# Patient Record
Sex: MALE | Race: BLACK OR AFRICAN AMERICAN | Employment: OTHER | URBAN - METROPOLITAN AREA
[De-identification: names, ages, dates, MRNs, and addresses within clinical notes are randomized per-mention and may not be internally consistent; named-entity substitution may affect disease eponyms.]

---

## 2020-07-31 ENCOUNTER — APPOINTMENT (OUTPATIENT)
Dept: GENERAL RADIOLOGY | Age: 65
End: 2020-07-31

## 2020-07-31 ENCOUNTER — HOSPITAL ENCOUNTER (EMERGENCY)
Age: 65
Discharge: HOME | End: 2020-07-31
Attending: EMERGENCY MEDICINE

## 2020-07-31 VITALS
TEMPERATURE: 99.1 F | DIASTOLIC BLOOD PRESSURE: 90 MMHG | HEART RATE: 85 BPM | SYSTOLIC BLOOD PRESSURE: 134 MMHG | RESPIRATION RATE: 20 BRPM | OXYGEN SATURATION: 98 %

## 2020-07-31 LAB
ANION GAP SERPL CALCULATED.3IONS-SCNC: 10 MMOL/L (ref 3–16)
BASE EXCESS VENOUS: 2.6 MMOL/L (ref -2–3)
BASOPHILS ABSOLUTE: 0 K/UL (ref 0–0.2)
BASOPHILS RELATIVE PERCENT: 0.5 %
BUN BLDV-MCNC: 9 MG/DL (ref 7–20)
C-REACTIVE PROTEIN: 11 MG/L (ref 0–5.1)
CALCIUM SERPL-MCNC: 9.1 MG/DL (ref 8.3–10.6)
CARBOXYHEMOGLOBIN: 3.1 % (ref 0–1.5)
CHLORIDE BLD-SCNC: 100 MMOL/L (ref 99–110)
CO2: 26 MMOL/L (ref 21–32)
CREAT SERPL-MCNC: 0.9 MG/DL (ref 0.8–1.3)
EKG ATRIAL RATE: 83 BPM
EKG DIAGNOSIS: NORMAL
EKG P AXIS: 26 DEGREES
EKG P-R INTERVAL: 192 MS
EKG Q-T INTERVAL: 380 MS
EKG QRS DURATION: 88 MS
EKG QTC CALCULATION (BAZETT): 446 MS
EKG R AXIS: 23 DEGREES
EKG T AXIS: 101 DEGREES
EKG VENTRICULAR RATE: 83 BPM
EOSINOPHILS ABSOLUTE: 0.1 K/UL (ref 0–0.6)
EOSINOPHILS RELATIVE PERCENT: 1.7 %
GFR AFRICAN AMERICAN: >60
GFR NON-AFRICAN AMERICAN: >60
GLUCOSE BLD-MCNC: 240 MG/DL (ref 70–99)
HCO3 VENOUS: 26.7 MMOL/L (ref 24–28)
HCT VFR BLD CALC: 43.9 % (ref 40.5–52.5)
HEMOGLOBIN, VEN, REDUCED: 5.2 %
HEMOGLOBIN: 14.5 G/DL (ref 13.5–17.5)
LACTIC ACID: 2 MMOL/L (ref 0.4–2)
LYMPHOCYTES ABSOLUTE: 1.5 K/UL (ref 1–5.1)
LYMPHOCYTES RELATIVE PERCENT: 19.2 %
MCH RBC QN AUTO: 25.8 PG (ref 26–34)
MCHC RBC AUTO-ENTMCNC: 33.1 G/DL (ref 31–36)
MCV RBC AUTO: 77.9 FL (ref 80–100)
METHEMOGLOBIN VENOUS: 0.4 % (ref 0–1.5)
MONOCYTES ABSOLUTE: 0.6 K/UL (ref 0–1.3)
MONOCYTES RELATIVE PERCENT: 8.1 %
NEUTROPHILS ABSOLUTE: 5.5 K/UL (ref 1.7–7.7)
NEUTROPHILS RELATIVE PERCENT: 70.5 %
O2 SAT, VEN: 95 %
PCO2, VEN: 41.2 MMHG (ref 41–51)
PDW BLD-RTO: 15.1 % (ref 12.4–15.4)
PH VENOUS: 7.43 (ref 7.35–7.45)
PLATELET # BLD: 184 K/UL (ref 135–450)
PMV BLD AUTO: 8.4 FL (ref 5–10.5)
PO2, VEN: 69.7 MMHG (ref 25–40)
POTASSIUM REFLEX MAGNESIUM: 4.1 MMOL/L (ref 3.5–5.1)
RBC # BLD: 5.64 M/UL (ref 4.2–5.9)
SEDIMENTATION RATE, ERYTHROCYTE: 24 MM/HR (ref 0–20)
SODIUM BLD-SCNC: 136 MMOL/L (ref 136–145)
TCO2 CALC VENOUS: 28 MMOL/L
WBC # BLD: 7.8 K/UL (ref 4–11)

## 2020-07-31 PROCEDURE — 2580000003 HC RX 258: Performed by: PHYSICIAN ASSISTANT

## 2020-07-31 PROCEDURE — 73630 X-RAY EXAM OF FOOT: CPT

## 2020-07-31 PROCEDURE — 99284 EMERGENCY DEPT VISIT MOD MDM: CPT

## 2020-07-31 PROCEDURE — 93005 ELECTROCARDIOGRAM TRACING: CPT | Performed by: PHYSICIAN ASSISTANT

## 2020-07-31 PROCEDURE — 85025 COMPLETE CBC W/AUTO DIFF WBC: CPT

## 2020-07-31 PROCEDURE — 85652 RBC SED RATE AUTOMATED: CPT

## 2020-07-31 PROCEDURE — 86140 C-REACTIVE PROTEIN: CPT

## 2020-07-31 PROCEDURE — 80048 BASIC METABOLIC PNL TOTAL CA: CPT

## 2020-07-31 PROCEDURE — 82803 BLOOD GASES ANY COMBINATION: CPT

## 2020-07-31 PROCEDURE — 83605 ASSAY OF LACTIC ACID: CPT

## 2020-07-31 RX ORDER — 0.9 % SODIUM CHLORIDE 0.9 %
1000 INTRAVENOUS SOLUTION INTRAVENOUS ONCE
Status: COMPLETED | OUTPATIENT
Start: 2020-07-31 | End: 2020-07-31

## 2020-07-31 RX ADMIN — SODIUM CHLORIDE 1000 ML: 0.9 INJECTION, SOLUTION INTRAVENOUS at 10:58

## 2020-07-31 ASSESSMENT — ENCOUNTER SYMPTOMS
SHORTNESS OF BREATH: 0
VOMITING: 0
NAUSEA: 0
COUGH: 0
ABDOMINAL PAIN: 0

## 2020-07-31 ASSESSMENT — PAIN DESCRIPTION - LOCATION: LOCATION: FOOT

## 2020-07-31 ASSESSMENT — PAIN DESCRIPTION - FREQUENCY: FREQUENCY: CONTINUOUS

## 2020-07-31 ASSESSMENT — PAIN DESCRIPTION - PROGRESSION: CLINICAL_PROGRESSION: GRADUALLY WORSENING

## 2020-07-31 ASSESSMENT — PAIN DESCRIPTION - ORIENTATION: ORIENTATION: LEFT

## 2020-07-31 ASSESSMENT — PAIN DESCRIPTION - ONSET: ONSET: GRADUAL

## 2020-07-31 ASSESSMENT — PAIN DESCRIPTION - PAIN TYPE: TYPE: ACUTE PAIN

## 2020-07-31 ASSESSMENT — PAIN - FUNCTIONAL ASSESSMENT: PAIN_FUNCTIONAL_ASSESSMENT: ACTIVITIES ARE NOT PREVENTED

## 2020-07-31 ASSESSMENT — PAIN SCALES - GENERAL: PAINLEVEL_OUTOF10: 10

## 2020-07-31 ASSESSMENT — PAIN DESCRIPTION - DESCRIPTORS: DESCRIPTORS: ACHING

## 2020-07-31 NOTE — ED PROVIDER NOTES
[facility]          PHYSICIAN ASSISTANT NOTE       Date of evaluation: 7/31/2020    Chief Complaint     Foot Pain (diabetic)      History of Present Illness     Ezequiel Arguello is a 75 y.o. male with PMH of Diabetes, HTN, CVA who presents with Foot pain. Patient reports pain in the lateral aspect of his left foot for the past several days. He has also noticed associated swelling. He denies any injury or trauma but notes that he walks several miles daily in boots that are poor support. He states that he has been soaking his feet in Episom salt. He denies taking anything for pain. Denies any erythema, wounds, or drainage. No n/t. No fevers, chills. He reports that he generally feels well. Review of Systems     Review of Systems   Constitutional: Negative for chills and fever. Respiratory: Negative for cough and shortness of breath. Cardiovascular: Negative for chest pain. Gastrointestinal: Negative for abdominal pain, nausea and vomiting. Musculoskeletal: Negative for gait problem. Skin: Positive for wound. Neurological: Negative for weakness and numbness. Psychiatric/Behavioral: The patient is not nervous/anxious. Past Medical, Surgical, Family, and Social History     He has a past medical history of Diabetes mellitus ([facility] [address] He has no past surgical history on file. His family history is not on file. He reports that he has quit smoking. He does not have any smokeless tobacco history on file. He reports previous alcohol use. He reports previous drug use. Medications     Previous Medications    No medications on file       Allergies     He has No Known Allergies. Physical Exam     INITIAL VITALS: BP: (!) 141/92,Temp: 99.1 °F (37.3 °C), Pulse: 99, Resp: 18, SpO2: 97 %   Physical Exam  Vitals signs and nursing note reviewed. Constitutional:       General: He is not in acute distress. HENT:      Head: Normocephalic and atraumatic. acute osseous abnormality.                 LABS:   Results for orders placed or performed during the hospital encounter of 07/31/20   CBC Auto Differential   Result Value Ref Range    WBC 7.8 4.0 - 11.0 K/uL    RBC 5.64 4.20 - 5.90 M/uL    Hemoglobin 14.5 13.5 - 17.5 g/dL    Hematocrit 43.9 40.5 - 52.5 %    MCV 77.9 (L) 80.0 - 100.0 fL    MCH 25.8 (L) 26.0 - 34.0 pg    MCHC 33.1 31.0 - 36.0 g/dL    RDW 15.1 12.4 - 15.4 %    Platelets 010 474 - 191 K/uL    MPV 8.4 5.0 - 10.5 fL    Neutrophils % 70.5 %    Lymphocytes % 19.2 %    Monocytes % 8.1 %    Eosinophils % 1.7 %    Basophils % 0.5 %    Neutrophils Absolute 5.5 1.7 - 7.7 K/uL    Lymphocytes Absolute 1.5 1.0 - 5.1 K/uL    Monocytes Absolute 0.6 0.0 - 1.3 K/uL    Eosinophils Absolute 0.1 0.0 - 0.6 K/uL    Basophils Absolute 0.0 0.0 - 0.2 K/uL   Basic Metabolic Panel w/ Reflex to MG   Result Value Ref Range    Sodium 136 136 - 145 mmol/L    Potassium reflex Magnesium 4.1 3.5 - 5.1 mmol/L    Chloride 100 99 - 110 mmol/L    CO2 26 21 - 32 mmol/L    Anion Gap 10 3 - 16    Glucose 240 (H) 70 - 99 mg/dL    BUN 9 7 - 20 mg/dL    CREATININE 0.9 0.8 - 1.3 mg/dL    GFR Non-African American >60 >60    GFR African American >60 >60    Calcium 9.1 8.3 - 10.6 mg/dL   Sedimentation Rate   Result Value Ref Range    Sed Rate 24 (H) 0 - 20 mm/Hr   CRP   Result Value Ref Range    CRP 11.0 (H) 0.0 - 5.1 mg/L   Blood gas, venous (Lab)   Result Value Ref Range    pH, El 7.427 7.350 - 7.450    pCO2, El 41.2 41.0 - 51.0 mmHg    pO2, El 69.7 (H) 25.0 - 40.0 mmHg    HCO3, Venous 26.7 24.0 - 28.0 mmol/L    Base Excess, El 2.6 -2.0 - 3.0 mmol/L    O2 Sat, El 95 Not established %    Carboxyhemoglobin 3.1 (H) 0.0 - 1.5 %    MetHgb, El 0.4 0.0 - 1.5 %    TC02 (Calc), El 28 mmol/L    Hemoglobin, El, Reduced 5.20 %   Lactate, plasma   Result Value Ref Range    Lactic Acid 2.0 0.4 - 2.0 mmol/L   EKG 12 Lead   Result Value Ref Range    Ventricular Rate 83 BPM    Atrial Rate 83 BPM    P-R Interval 192 ms    QRS Duration 88 ms    Q-T Interval 380 ms    QTc Calculation (Bazett) 446 ms    P Axis 26 degrees    R Axis 23 degrees    T Axis 101 degrees    Diagnosis       EKG performed in ER and to be interpreted by ER physician. Confirmed by MD, ER ([id number]),  Sol Coyne ([phone]) on 7/31/2020 11:41:41 AM       RECENT VITALS:  BP: (!) 134/90, Temp: 99.1 °F (37.3 °C), Pulse: 85,Resp: 20, SpO2: 98 %     Procedures         ED Course     Nursing Notes, Past Medical Hx, Past Surgical Hx, Social Hx, Allergies, and Family Hx were reviewed. The patient was given the followingmedications:  Orders Placed This Encounter   Medications    0.9 % sodium chloride bolus       CONSULTS:  [id number] Rolanda Jamil / ROMELIA / Sandra Lindsey is a 75 y.o. male with PMH of Diabetes, HTN, CVA who presents with left foot pain. Patient denies any injury or trauma. He states he has been walking several miles daily. He complains of pain to the lateral plantar aspect of the left foot with associated swelling. No fevers or other systemic symptoms. Physical exam reveals an obese 49-year-old male in no acute distress. He is afebrile. He is tachycardic to 130's with regular rhythm. Lungs clear. Abdomen soft and non-tender. Left foot with callus on the plantar surface of the foot and great toe. There is tenderness and mild swelling noted to the lateral plantar surface of the left foot. No overlying erythema or warmth. No active drainage or fluctuance. Onychomycotic toenails. There is a superficial ulceration to the base of the 1st toe on the plantar surface without erythema, warmth, drainage. See pictures above. Chronic changes to bilateral feet. No obvious infection on exam. However, given initial tachycardia, w/u including labs and Xray obtained. EKG revealed NSR with HR 83, non-specific T wave inversion in V6.  Read similar in care everywhere 5/2017. (tachycardia resolved

## 2020-07-31 NOTE — CONSULTS
Department of Podiatry Consult Note  Resident       Reason for Consult:  Left foot pain  Requesting Physician:  Terri Archer    CHIEF COMPLAINT:  Left foot pain    HISTORY OF PRESENT ILLNESS:                The patient is a 75 y.o. male with significant past medical history as listed below. Podiatry was consulted for left foot pain. Patient reports left foot pain beginning 2 weeks ago. He does not recall a traumatic event that started his pain. He reports that he walks miles at work daily and is on his feet in steel toe boots all day. He describing the pain as sharp as time and rates it 7/10 when walking on it. He also reports that he has an ulcer to his right hallux that has been present for a month or so. He reports that he has been trimming it with a razor as needed. Patient denies F/C/N/V/SOB/chest pain. Patient denies calf pain. Past Medical History:        Diagnosis Date    Diabetes mellitus ([facility] [address]      Past Surgical History:    History reviewed. No pertinent surgical history. Current Medications:    No current facility-administered medications for this encounter. Allergies:   Patient has no known allergies. Social History:    TOBACCO:   reports that he has quit smoking. He does not have any smokeless tobacco history on file. ETOH:   reports previous alcohol use. DRUGS:   reports previous drug use. Family History:   History reviewed. No pertinent family history. Review of Systems: A 12 point review of systems is unremarkable with the exception of the chief complaint. Patient specifically denies nausea, vomiting, fever, chills, shortness of breath, chest pain, abdominal pain, constipation, or difficulty urinating.    PHYSICAL EXAM:      Vitals:    /76   Pulse 91   Temp 99.1 °F (37.3 °C) (Oral)   Resp 18   SpO2 96%     LABS:   Recent Labs     07/31/20  1013   WBC 7.8   HGB 14.5   HCT 43.9        Recent Labs     07/31/20  1013      K 4.1      CO2 26   BUN 9   CREATININE -Hypertensive, otherwise VSS. No Leukocytosis (WBC 7.8)  -CRP 11, ESR 24  -Using a #10 blade, sharp excisional debridement of all nonviable & necrotic tissue down to and including subcutaneous tissue. Less than 1cc of bleeding noted, direct compression was used for hemostasis. Patient tolerated procedure well. -Xrays reviewed and impression noted above. -Dressing of: Betadine, gauze, kerlix, and Ace bandage applied to right foot. -CAM boot to be applied to left foot.  -Patient is to be heel weight bearing to right leg and weight bearing as tolerated to left foot in CAM boot. -Patient is to follow-up with Dr. Sadie Alvarez in his private office.  -Instructed patient to check for signs of infection including but not limited to fever, nausea, vomiting, wound pain and redness, and to call if any signs present. Dispo: Full thickness ulceration right hallux & left foot pain likely 2/2 stress injury. Patient is stable at this time and without signs of infection. Patient is able to discharge from Podiatric standpoint. Patient will need to call Dr. Sadie Alvarez today to make an appointment for wound recheck and follow-up of stress injury.     Marshall Rosenbaum DPM  Podiatric Resident, PGY-1  Pager #: [phone] or Tashia

## 2020-07-31 NOTE — ED PROVIDER NOTES
ED Attending Attestation Note     Date of evaluation: 7/31/2020    This patient was seen by the advance practice provider. I have seen and examined the patient, agree with the workup, evaluation, management and diagnosis. The care plan has been discussed. My assessment reveals a 75year old male with a past medical history of HTN and diabetes who presents with left foot pain. On my evaluation he is well appearing with no complaints. He has a tachycardic rate and rhythm without murmur. His lungs are lear. His left foot has intact DP pulses. He has 5/5 PF/DF/EHL. SILT. He has no significant tenderness on my exam. He has fungal changes to his nails and toes. No erythema, warmth, or signs of infection.         Akash Walker MD  07/31/20 9627

## 2020-09-14 ENCOUNTER — APPOINTMENT (OUTPATIENT)
Dept: VASCULAR LAB | Age: 65
DRG: 616 | End: 2020-09-14
Payer: MEDICARE

## 2020-09-14 ENCOUNTER — APPOINTMENT (OUTPATIENT)
Dept: GENERAL RADIOLOGY | Age: 65
DRG: 616 | End: 2020-09-14
Payer: MEDICARE

## 2020-09-14 ENCOUNTER — ANESTHESIA EVENT (OUTPATIENT)
Dept: OPERATING ROOM | Age: 65
DRG: 616 | End: 2020-09-14
Payer: MEDICARE

## 2020-09-14 ENCOUNTER — HOSPITAL ENCOUNTER (INPATIENT)
Age: 65
LOS: 4 days | Discharge: NURSING, REHAB OR LONG-TERM CARE | DRG: 616 | End: 2020-09-18
Attending: EMERGENCY MEDICINE | Admitting: INTERNAL MEDICINE
Payer: MEDICARE

## 2020-09-14 ENCOUNTER — ANESTHESIA (OUTPATIENT)
Dept: OPERATING ROOM | Age: 65
DRG: 616 | End: 2020-09-14
Payer: MEDICARE

## 2020-09-14 VITALS — DIASTOLIC BLOOD PRESSURE: 65 MMHG | SYSTOLIC BLOOD PRESSURE: 99 MMHG | OXYGEN SATURATION: 100 %

## 2020-09-14 PROBLEM — L97.519 RIGHT FOOT ULCER, WITH UNSPECIFIED SEVERITY (HCC): Status: ACTIVE | Noted: 2020-09-14

## 2020-09-14 LAB
A/G RATIO: 0.7 (ref 1.1–2.2)
ALBUMIN SERPL-MCNC: 3.1 G/DL (ref 3.4–5)
ALP BLD-CCNC: 117 U/L (ref 40–129)
ALT SERPL-CCNC: 26 U/L (ref 10–40)
ANION GAP SERPL CALCULATED.3IONS-SCNC: 11 MMOL/L (ref 3–16)
AST SERPL-CCNC: 21 U/L (ref 15–37)
BASE EXCESS VENOUS: 1.8 MMOL/L (ref -2–3)
BASOPHILS ABSOLUTE: 0.1 K/UL (ref 0–0.2)
BASOPHILS RELATIVE PERCENT: 0.6 %
BILIRUB SERPL-MCNC: 0.7 MG/DL (ref 0–1)
BUN BLDV-MCNC: 10 MG/DL (ref 7–20)
C-REACTIVE PROTEIN: 204.8 MG/L (ref 0–5.1)
CALCIUM SERPL-MCNC: 9.1 MG/DL (ref 8.3–10.6)
CARBOXYHEMOGLOBIN: 3.5 % (ref 0–1.5)
CHLORIDE BLD-SCNC: 96 MMOL/L (ref 99–110)
CO2: 24 MMOL/L (ref 21–32)
CREAT SERPL-MCNC: 0.8 MG/DL (ref 0.8–1.3)
EKG ATRIAL RATE: 82 BPM
EKG DIAGNOSIS: NORMAL
EKG P AXIS: 13 DEGREES
EKG P-R INTERVAL: 192 MS
EKG Q-T INTERVAL: 318 MS
EKG QRS DURATION: 88 MS
EKG QTC CALCULATION (BAZETT): 371 MS
EKG R AXIS: 24 DEGREES
EKG T AXIS: 94 DEGREES
EKG VENTRICULAR RATE: 82 BPM
EOSINOPHILS ABSOLUTE: 0.2 K/UL (ref 0–0.6)
EOSINOPHILS RELATIVE PERCENT: 1.6 %
GFR AFRICAN AMERICAN: >60
GFR NON-AFRICAN AMERICAN: >60
GLOBULIN: 4.7 G/DL
GLUCOSE BLD-MCNC: 234 MG/DL (ref 70–99)
GLUCOSE BLD-MCNC: 275 MG/DL (ref 70–99)
GLUCOSE BLD-MCNC: 315 MG/DL (ref 70–99)
HCO3 VENOUS: 25.7 MMOL/L (ref 24–28)
HCT VFR BLD CALC: 38.9 % (ref 40.5–52.5)
HEMOGLOBIN, VEN, REDUCED: 19.4 %
HEMOGLOBIN: 12.8 G/DL (ref 13.5–17.5)
INR BLD: 1.2 (ref 0.86–1.14)
LACTIC ACID: 1.1 MMOL/L (ref 0.4–2)
LYMPHOCYTES ABSOLUTE: 1.2 K/UL (ref 1–5.1)
LYMPHOCYTES RELATIVE PERCENT: 8.8 %
MCH RBC QN AUTO: 24.7 PG (ref 26–34)
MCHC RBC AUTO-ENTMCNC: 32.9 G/DL (ref 31–36)
MCV RBC AUTO: 75.1 FL (ref 80–100)
METHEMOGLOBIN VENOUS: 0.5 % (ref 0–1.5)
MONOCYTES ABSOLUTE: 1.3 K/UL (ref 0–1.3)
MONOCYTES RELATIVE PERCENT: 9 %
NEUTROPHILS ABSOLUTE: 11.2 K/UL (ref 1.7–7.7)
NEUTROPHILS RELATIVE PERCENT: 80 %
O2 SAT, VEN: 80 %
PCO2, VEN: 40 MMHG (ref 41–51)
PDW BLD-RTO: 15.4 % (ref 12.4–15.4)
PERFORMED ON: ABNORMAL
PERFORMED ON: ABNORMAL
PH VENOUS: 7.42 (ref 7.35–7.45)
PLATELET # BLD: 292 K/UL (ref 135–450)
PMV BLD AUTO: 7.8 FL (ref 5–10.5)
PO2, VEN: 44.3 MMHG (ref 25–40)
POTASSIUM REFLEX MAGNESIUM: 4 MMOL/L (ref 3.5–5.1)
PROTHROMBIN TIME: 14 SEC (ref 10–13.2)
RBC # BLD: 5.17 M/UL (ref 4.2–5.9)
SARS-COV-2, NAAT: NOT DETECTED
SEDIMENTATION RATE, ERYTHROCYTE: 98 MM/HR (ref 0–20)
SODIUM BLD-SCNC: 131 MMOL/L (ref 136–145)
TCO2 CALC VENOUS: 27 MMOL/L
TOTAL PROTEIN: 7.8 G/DL (ref 6.4–8.2)
WBC # BLD: 14 K/UL (ref 4–11)

## 2020-09-14 PROCEDURE — 6360000002 HC RX W HCPCS: Performed by: NURSE ANESTHETIST, CERTIFIED REGISTERED

## 2020-09-14 PROCEDURE — 96366 THER/PROPH/DIAG IV INF ADDON: CPT

## 2020-09-14 PROCEDURE — 86140 C-REACTIVE PROTEIN: CPT

## 2020-09-14 PROCEDURE — 2500000003 HC RX 250 WO HCPCS: Performed by: PODIATRIST

## 2020-09-14 PROCEDURE — 6360000002 HC RX W HCPCS: Performed by: PODIATRIST

## 2020-09-14 PROCEDURE — 87070 CULTURE OTHR SPECIMN AEROBIC: CPT

## 2020-09-14 PROCEDURE — 2580000003 HC RX 258: Performed by: EMERGENCY MEDICINE

## 2020-09-14 PROCEDURE — 73630 X-RAY EXAM OF FOOT: CPT

## 2020-09-14 PROCEDURE — 96365 THER/PROPH/DIAG IV INF INIT: CPT

## 2020-09-14 PROCEDURE — 96367 TX/PROPH/DG ADDL SEQ IV INF: CPT

## 2020-09-14 PROCEDURE — 7100000000 HC PACU RECOVERY - FIRST 15 MIN: Performed by: PODIATRIST

## 2020-09-14 PROCEDURE — 87205 SMEAR GRAM STAIN: CPT

## 2020-09-14 PROCEDURE — 71045 X-RAY EXAM CHEST 1 VIEW: CPT

## 2020-09-14 PROCEDURE — 85652 RBC SED RATE AUTOMATED: CPT

## 2020-09-14 PROCEDURE — 88311 DECALCIFY TISSUE: CPT

## 2020-09-14 PROCEDURE — 93971 EXTREMITY STUDY: CPT

## 2020-09-14 PROCEDURE — 7100000001 HC PACU RECOVERY - ADDTL 15 MIN: Performed by: PODIATRIST

## 2020-09-14 PROCEDURE — 2580000003 HC RX 258: Performed by: PODIATRIST

## 2020-09-14 PROCEDURE — 3700000000 HC ANESTHESIA ATTENDED CARE: Performed by: PODIATRIST

## 2020-09-14 PROCEDURE — 85610 PROTHROMBIN TIME: CPT

## 2020-09-14 PROCEDURE — 1200000000 HC SEMI PRIVATE

## 2020-09-14 PROCEDURE — 36415 COLL VENOUS BLD VENIPUNCTURE: CPT

## 2020-09-14 PROCEDURE — 99285 EMERGENCY DEPT VISIT HI MDM: CPT

## 2020-09-14 PROCEDURE — 88305 TISSUE EXAM BY PATHOLOGIST: CPT

## 2020-09-14 PROCEDURE — 3700000001 HC ADD 15 MINUTES (ANESTHESIA): Performed by: PODIATRIST

## 2020-09-14 PROCEDURE — 83605 ASSAY OF LACTIC ACID: CPT

## 2020-09-14 PROCEDURE — 87077 CULTURE AEROBIC IDENTIFY: CPT

## 2020-09-14 PROCEDURE — 93005 ELECTROCARDIOGRAM TRACING: CPT | Performed by: PODIATRIST

## 2020-09-14 PROCEDURE — U0002 COVID-19 LAB TEST NON-CDC: HCPCS

## 2020-09-14 PROCEDURE — 2709999900 HC NON-CHARGEABLE SUPPLY: Performed by: PODIATRIST

## 2020-09-14 PROCEDURE — 83036 HEMOGLOBIN GLYCOSYLATED A1C: CPT

## 2020-09-14 PROCEDURE — 6360000002 HC RX W HCPCS: Performed by: EMERGENCY MEDICINE

## 2020-09-14 PROCEDURE — 82803 BLOOD GASES ANY COMBINATION: CPT

## 2020-09-14 PROCEDURE — 6370000000 HC RX 637 (ALT 250 FOR IP): Performed by: PODIATRIST

## 2020-09-14 PROCEDURE — 3600000002 HC SURGERY LEVEL 2 BASE: Performed by: PODIATRIST

## 2020-09-14 PROCEDURE — 0Y6P0Z1 DETACHMENT AT RIGHT 1ST TOE, HIGH, OPEN APPROACH: ICD-10-PCS | Performed by: PODIATRIST

## 2020-09-14 PROCEDURE — 3600000012 HC SURGERY LEVEL 2 ADDTL 15MIN: Performed by: PODIATRIST

## 2020-09-14 PROCEDURE — 87186 SC STD MICRODIL/AGAR DIL: CPT

## 2020-09-14 PROCEDURE — 86403 PARTICLE AGGLUT ANTBDY SCRN: CPT

## 2020-09-14 PROCEDURE — 80053 COMPREHEN METABOLIC PANEL: CPT

## 2020-09-14 PROCEDURE — 85025 COMPLETE CBC W/AUTO DIFF WBC: CPT

## 2020-09-14 RX ORDER — LABETALOL 20 MG/4 ML (5 MG/ML) INTRAVENOUS SYRINGE
5 EVERY 10 MIN PRN
Status: DISCONTINUED | OUTPATIENT
Start: 2020-09-14 | End: 2020-09-14 | Stop reason: HOSPADM

## 2020-09-14 RX ORDER — CLOPIDOGREL BISULFATE 75 MG/1
75 TABLET ORAL DAILY
COMMUNITY

## 2020-09-14 RX ORDER — HYDRALAZINE HYDROCHLORIDE 20 MG/ML
5 INJECTION INTRAMUSCULAR; INTRAVENOUS EVERY 10 MIN PRN
Status: DISCONTINUED | OUTPATIENT
Start: 2020-09-14 | End: 2020-09-14 | Stop reason: HOSPADM

## 2020-09-14 RX ORDER — FAMOTIDINE 20 MG/1
20 TABLET, FILM COATED ORAL 2 TIMES DAILY
Status: DISCONTINUED | OUTPATIENT
Start: 2020-09-14 | End: 2020-09-18 | Stop reason: HOSPADM

## 2020-09-14 RX ORDER — ACETAMINOPHEN 650 MG/1
650 SUPPOSITORY RECTAL EVERY 6 HOURS PRN
Status: DISCONTINUED | OUTPATIENT
Start: 2020-09-14 | End: 2020-09-18 | Stop reason: HOSPADM

## 2020-09-14 RX ORDER — ASPIRIN 81 MG/1
81 TABLET ORAL DAILY
COMMUNITY

## 2020-09-14 RX ORDER — DEXTROSE MONOHYDRATE 25 G/50ML
12.5 INJECTION, SOLUTION INTRAVENOUS PRN
Status: DISCONTINUED | OUTPATIENT
Start: 2020-09-14 | End: 2020-09-18 | Stop reason: HOSPADM

## 2020-09-14 RX ORDER — SENNA AND DOCUSATE SODIUM 50; 8.6 MG/1; MG/1
1 TABLET, FILM COATED ORAL 2 TIMES DAILY
Status: DISCONTINUED | OUTPATIENT
Start: 2020-09-14 | End: 2020-09-18 | Stop reason: HOSPADM

## 2020-09-14 RX ORDER — INSULIN LISPRO 100 [IU]/ML
0-3 INJECTION, SOLUTION INTRAVENOUS; SUBCUTANEOUS NIGHTLY
Status: DISCONTINUED | OUTPATIENT
Start: 2020-09-14 | End: 2020-09-18 | Stop reason: HOSPADM

## 2020-09-14 RX ORDER — PROMETHAZINE HYDROCHLORIDE 25 MG/1
12.5 TABLET ORAL EVERY 6 HOURS PRN
Status: DISCONTINUED | OUTPATIENT
Start: 2020-09-14 | End: 2020-09-18 | Stop reason: HOSPADM

## 2020-09-14 RX ORDER — MEPERIDINE HYDROCHLORIDE 25 MG/ML
12.5 INJECTION INTRAMUSCULAR; INTRAVENOUS; SUBCUTANEOUS EVERY 5 MIN PRN
Status: DISCONTINUED | OUTPATIENT
Start: 2020-09-14 | End: 2020-09-14 | Stop reason: HOSPADM

## 2020-09-14 RX ORDER — ATORVASTATIN CALCIUM 40 MG/1
40 TABLET, FILM COATED ORAL DAILY
COMMUNITY

## 2020-09-14 RX ORDER — INSULIN LISPRO 100 [IU]/ML
0-6 INJECTION, SOLUTION INTRAVENOUS; SUBCUTANEOUS
Status: DISCONTINUED | OUTPATIENT
Start: 2020-09-14 | End: 2020-09-18 | Stop reason: HOSPADM

## 2020-09-14 RX ORDER — ONDANSETRON 2 MG/ML
INJECTION INTRAMUSCULAR; INTRAVENOUS PRN
Status: DISCONTINUED | OUTPATIENT
Start: 2020-09-14 | End: 2020-09-14 | Stop reason: SDUPTHER

## 2020-09-14 RX ORDER — HYDROMORPHONE HCL 110MG/55ML
PATIENT CONTROLLED ANALGESIA SYRINGE INTRAVENOUS PRN
Status: DISCONTINUED | OUTPATIENT
Start: 2020-09-14 | End: 2020-09-14 | Stop reason: SDUPTHER

## 2020-09-14 RX ORDER — MAGNESIUM SULFATE 1 G/100ML
1 INJECTION INTRAVENOUS PRN
Status: DISCONTINUED | OUTPATIENT
Start: 2020-09-14 | End: 2020-09-18 | Stop reason: HOSPADM

## 2020-09-14 RX ORDER — PROPOFOL 10 MG/ML
INJECTION, EMULSION INTRAVENOUS PRN
Status: DISCONTINUED | OUTPATIENT
Start: 2020-09-14 | End: 2020-09-14 | Stop reason: SDUPTHER

## 2020-09-14 RX ORDER — ACETAMINOPHEN 325 MG/1
650 TABLET ORAL EVERY 6 HOURS PRN
Status: DISCONTINUED | OUTPATIENT
Start: 2020-09-14 | End: 2020-09-18 | Stop reason: HOSPADM

## 2020-09-14 RX ORDER — MULTIVIT-MIN/IRON/FOLIC ACID/K 18-600-40
2000 CAPSULE ORAL DAILY
COMMUNITY

## 2020-09-14 RX ORDER — ONDANSETRON 2 MG/ML
4 INJECTION INTRAMUSCULAR; INTRAVENOUS EVERY 6 HOURS PRN
Status: DISCONTINUED | OUTPATIENT
Start: 2020-09-14 | End: 2020-09-18 | Stop reason: HOSPADM

## 2020-09-14 RX ORDER — DEXTROSE MONOHYDRATE 50 MG/ML
100 INJECTION, SOLUTION INTRAVENOUS PRN
Status: DISCONTINUED | OUTPATIENT
Start: 2020-09-14 | End: 2020-09-18 | Stop reason: HOSPADM

## 2020-09-14 RX ORDER — LIDOCAINE HYDROCHLORIDE 20 MG/ML
INJECTION, SOLUTION INTRAVENOUS PRN
Status: DISCONTINUED | OUTPATIENT
Start: 2020-09-14 | End: 2020-09-14 | Stop reason: SDUPTHER

## 2020-09-14 RX ORDER — METOCLOPRAMIDE HYDROCHLORIDE 5 MG/ML
10 INJECTION INTRAMUSCULAR; INTRAVENOUS
Status: DISCONTINUED | OUTPATIENT
Start: 2020-09-14 | End: 2020-09-14 | Stop reason: HOSPADM

## 2020-09-14 RX ORDER — DIPHENHYDRAMINE HYDROCHLORIDE 50 MG/ML
12.5 INJECTION INTRAMUSCULAR; INTRAVENOUS
Status: DISCONTINUED | OUTPATIENT
Start: 2020-09-14 | End: 2020-09-14 | Stop reason: HOSPADM

## 2020-09-14 RX ORDER — BUPIVACAINE HYDROCHLORIDE 5 MG/ML
INJECTION, SOLUTION EPIDURAL; INTRACAUDAL PRN
Status: DISCONTINUED | OUTPATIENT
Start: 2020-09-14 | End: 2020-09-14 | Stop reason: ALTCHOICE

## 2020-09-14 RX ORDER — MIDAZOLAM HYDROCHLORIDE 1 MG/ML
INJECTION INTRAMUSCULAR; INTRAVENOUS PRN
Status: DISCONTINUED | OUTPATIENT
Start: 2020-09-14 | End: 2020-09-14 | Stop reason: SDUPTHER

## 2020-09-14 RX ORDER — GLIPIZIDE 10 MG/1
10 TABLET ORAL
COMMUNITY

## 2020-09-14 RX ORDER — LISINOPRIL 40 MG/1
40 TABLET ORAL DAILY
COMMUNITY

## 2020-09-14 RX ORDER — FENTANYL CITRATE 50 UG/ML
INJECTION, SOLUTION INTRAMUSCULAR; INTRAVENOUS PRN
Status: DISCONTINUED | OUTPATIENT
Start: 2020-09-14 | End: 2020-09-14 | Stop reason: SDUPTHER

## 2020-09-14 RX ORDER — OXYCODONE HYDROCHLORIDE 5 MG/1
5 TABLET ORAL PRN
Status: DISCONTINUED | OUTPATIENT
Start: 2020-09-14 | End: 2020-09-14 | Stop reason: HOSPADM

## 2020-09-14 RX ORDER — POTASSIUM CHLORIDE 7.45 MG/ML
10 INJECTION INTRAVENOUS PRN
Status: DISCONTINUED | OUTPATIENT
Start: 2020-09-14 | End: 2020-09-18 | Stop reason: HOSPADM

## 2020-09-14 RX ORDER — INSULIN LISPRO 100 [IU]/ML
0.08 INJECTION, SOLUTION INTRAVENOUS; SUBCUTANEOUS
Status: DISCONTINUED | OUTPATIENT
Start: 2020-09-14 | End: 2020-09-18 | Stop reason: HOSPADM

## 2020-09-14 RX ORDER — MORPHINE SULFATE 4 MG/ML
1 INJECTION, SOLUTION INTRAMUSCULAR; INTRAVENOUS EVERY 5 MIN PRN
Status: DISCONTINUED | OUTPATIENT
Start: 2020-09-14 | End: 2020-09-14 | Stop reason: HOSPADM

## 2020-09-14 RX ORDER — NICOTINE POLACRILEX 4 MG
15 LOZENGE BUCCAL PRN
Status: DISCONTINUED | OUTPATIENT
Start: 2020-09-14 | End: 2020-09-18 | Stop reason: HOSPADM

## 2020-09-14 RX ORDER — OXYCODONE HYDROCHLORIDE 5 MG/1
10 TABLET ORAL PRN
Status: DISCONTINUED | OUTPATIENT
Start: 2020-09-14 | End: 2020-09-14 | Stop reason: HOSPADM

## 2020-09-14 RX ORDER — INSULIN GLARGINE 100 [IU]/ML
21 INJECTION, SOLUTION SUBCUTANEOUS NIGHTLY
COMMUNITY

## 2020-09-14 RX ORDER — SODIUM CHLORIDE 0.9 % (FLUSH) 0.9 %
10 SYRINGE (ML) INJECTION EVERY 12 HOURS SCHEDULED
Status: DISCONTINUED | OUTPATIENT
Start: 2020-09-14 | End: 2020-09-18 | Stop reason: HOSPADM

## 2020-09-14 RX ORDER — SODIUM CHLORIDE 0.9 % (FLUSH) 0.9 %
10 SYRINGE (ML) INJECTION PRN
Status: DISCONTINUED | OUTPATIENT
Start: 2020-09-14 | End: 2020-09-18 | Stop reason: HOSPADM

## 2020-09-14 RX ORDER — PROMETHAZINE HYDROCHLORIDE 25 MG/ML
6.25 INJECTION, SOLUTION INTRAMUSCULAR; INTRAVENOUS
Status: DISCONTINUED | OUTPATIENT
Start: 2020-09-14 | End: 2020-09-14 | Stop reason: HOSPADM

## 2020-09-14 RX ADMIN — MIDAZOLAM HYDROCHLORIDE 2 MG: 2 INJECTION, SOLUTION INTRAMUSCULAR; INTRAVENOUS at 17:45

## 2020-09-14 RX ADMIN — DOCUSATE SODIUM 50 MG AND SENNOSIDES 8.6 MG 1 TABLET: 8.6; 5 TABLET, FILM COATED ORAL at 20:59

## 2020-09-14 RX ADMIN — FENTANYL CITRATE 50 MCG: 50 INJECTION INTRAMUSCULAR; INTRAVENOUS at 17:54

## 2020-09-14 RX ADMIN — PROPOFOL 50 MG: 10 INJECTION, EMULSION INTRAVENOUS at 18:10

## 2020-09-14 RX ADMIN — FENTANYL CITRATE 25 MCG: 50 INJECTION INTRAMUSCULAR; INTRAVENOUS at 18:05

## 2020-09-14 RX ADMIN — PIPERACILLIN AND TAZOBACTAM 3.38 G: 3; .375 INJECTION, POWDER, FOR SOLUTION INTRAVENOUS at 12:16

## 2020-09-14 RX ADMIN — FAMOTIDINE 20 MG: 20 TABLET, FILM COATED ORAL at 20:59

## 2020-09-14 RX ADMIN — INSULIN GLARGINE 10 UNITS: 100 INJECTION, SOLUTION SUBCUTANEOUS at 21:40

## 2020-09-14 RX ADMIN — Medication 10 ML: at 20:59

## 2020-09-14 RX ADMIN — PROPOFOL 200 MG: 10 INJECTION, EMULSION INTRAVENOUS at 17:54

## 2020-09-14 RX ADMIN — VANCOMYCIN HYDROCHLORIDE 1750 MG: 10 INJECTION, POWDER, LYOPHILIZED, FOR SOLUTION INTRAVENOUS at 13:26

## 2020-09-14 RX ADMIN — LIDOCAINE HYDROCHLORIDE 100 MG: 20 INJECTION, SOLUTION INTRAVENOUS at 17:54

## 2020-09-14 RX ADMIN — FENTANYL CITRATE 25 MCG: 50 INJECTION INTRAMUSCULAR; INTRAVENOUS at 18:43

## 2020-09-14 RX ADMIN — ONDANSETRON 4 MG: 2 INJECTION INTRAMUSCULAR; INTRAVENOUS at 18:36

## 2020-09-14 RX ADMIN — HYDROMORPHONE HYDROCHLORIDE 1 MG: 2 INJECTION, SOLUTION INTRAMUSCULAR; INTRAVENOUS; SUBCUTANEOUS at 18:50

## 2020-09-14 RX ADMIN — PIPERACILLIN AND TAZOBACTAM 3.38 G: 3; .375 INJECTION, POWDER, LYOPHILIZED, FOR SOLUTION INTRAVENOUS at 20:59

## 2020-09-14 RX ADMIN — INSULIN LISPRO 1 UNITS: 100 INJECTION, SOLUTION INTRAVENOUS; SUBCUTANEOUS at 21:41

## 2020-09-14 ASSESSMENT — PULMONARY FUNCTION TESTS
PIF_VALUE: 14
PIF_VALUE: 1
PIF_VALUE: 13
PIF_VALUE: 13
PIF_VALUE: 17
PIF_VALUE: 13
PIF_VALUE: 19
PIF_VALUE: 13
PIF_VALUE: 17
PIF_VALUE: 13
PIF_VALUE: 12
PIF_VALUE: 12
PIF_VALUE: 15
PIF_VALUE: 1
PIF_VALUE: 0
PIF_VALUE: 12
PIF_VALUE: 12
PIF_VALUE: 1
PIF_VALUE: 12
PIF_VALUE: 0
PIF_VALUE: 0
PIF_VALUE: 13
PIF_VALUE: 2
PIF_VALUE: 1
PIF_VALUE: 12
PIF_VALUE: 17
PIF_VALUE: 12
PIF_VALUE: 13
PIF_VALUE: 12
PIF_VALUE: 0
PIF_VALUE: 12
PIF_VALUE: 0
PIF_VALUE: 15
PIF_VALUE: 12
PIF_VALUE: 1
PIF_VALUE: 13
PIF_VALUE: 13
PIF_VALUE: 14
PIF_VALUE: 46
PIF_VALUE: 17
PIF_VALUE: 13
PIF_VALUE: 12
PIF_VALUE: 15
PIF_VALUE: 13
PIF_VALUE: 0
PIF_VALUE: 0
PIF_VALUE: 2
PIF_VALUE: 5
PIF_VALUE: 5
PIF_VALUE: 12
PIF_VALUE: 12
PIF_VALUE: 4
PIF_VALUE: 1
PIF_VALUE: 17
PIF_VALUE: 15
PIF_VALUE: 2
PIF_VALUE: 12
PIF_VALUE: 12
PIF_VALUE: 4
PIF_VALUE: 12
PIF_VALUE: 1
PIF_VALUE: 0
PIF_VALUE: 1
PIF_VALUE: 0
PIF_VALUE: 12
PIF_VALUE: 27
PIF_VALUE: 10
PIF_VALUE: 0
PIF_VALUE: 13
PIF_VALUE: 0
PIF_VALUE: 8
PIF_VALUE: 13
PIF_VALUE: 16
PIF_VALUE: 15
PIF_VALUE: 0
PIF_VALUE: 17
PIF_VALUE: 13
PIF_VALUE: 15
PIF_VALUE: 12
PIF_VALUE: 12
PIF_VALUE: 13
PIF_VALUE: 13
PIF_VALUE: 17
PIF_VALUE: 13
PIF_VALUE: 13
PIF_VALUE: 17
PIF_VALUE: 11

## 2020-09-14 ASSESSMENT — PAIN SCALES - GENERAL
PAINLEVEL_OUTOF10: 0
PAINLEVEL_OUTOF10: 10

## 2020-09-14 ASSESSMENT — ENCOUNTER SYMPTOMS
VOMITING: 0
EYE DISCHARGE: 0
TROUBLE SWALLOWING: 0
APNEA: 0
FACIAL SWELLING: 0
ABDOMINAL DISTENTION: 0

## 2020-09-14 ASSESSMENT — PAIN DESCRIPTION - FREQUENCY: FREQUENCY: CONTINUOUS

## 2020-09-14 ASSESSMENT — PAIN DESCRIPTION - ONSET: ONSET: ON-GOING

## 2020-09-14 ASSESSMENT — PAIN DESCRIPTION - PROGRESSION: CLINICAL_PROGRESSION: GRADUALLY WORSENING

## 2020-09-14 ASSESSMENT — PAIN - FUNCTIONAL ASSESSMENT: PAIN_FUNCTIONAL_ASSESSMENT: PREVENTS OR INTERFERES SOME ACTIVE ACTIVITIES AND ADLS

## 2020-09-14 ASSESSMENT — PAIN DESCRIPTION - ORIENTATION: ORIENTATION: RIGHT

## 2020-09-14 ASSESSMENT — PAIN DESCRIPTION - LOCATION: LOCATION: LEG;FOOT

## 2020-09-14 ASSESSMENT — PAIN DESCRIPTION - PAIN TYPE: TYPE: ACUTE PAIN

## 2020-09-14 ASSESSMENT — PAIN DESCRIPTION - DESCRIPTORS: DESCRIPTORS: SHARP

## 2020-09-14 NOTE — CONSULTS
Clinical Pharmacy Progress Note    Admit date: 9/14/2020   Patient presents to the ED with complaints of leg swelling and worsening ulcer. Concern for diabetic foot ulcer; Podiatry consulted, and vancomycin + Zosyn ordered. Pharmacy is consulted to dose Vancomycin x1 dose in ED per Dr. Rick Cook. Ht = 73 in  Wt = 106.6 kg      Assessment/Plan:  · Will order Vancomycin 1.75g IV x1 for a loading dose in the ED per consult. · If Vancomycin is to continue on admission and pharmacy is to manage dosing, please re-consult with admission orders.       Thanks--  Jennifer Johnson PharmD., BCPS   9/14/2020 1:14 PM  Wireless: [phone]

## 2020-09-14 NOTE — CONSULTS
Clinical Pharmacy Consult Note    Admit date: 9/14/2020    Subjective/Objective:  Patient is a 75 y.o. male with history of DM2 with a RLE wound that began with some swelling several weeks ago. It progressed to pain, redness, warmth, and swellingand developed an ulcer with purulent material.    Pharmacy is consulted to dose Vancomycin per Dr. Cori Manzo. Pertinent Medications:  Zosyn 3.375 g q8h EXTENDED INFUSION-- day #1  Vancomycin 1.75g q12h -- day # 1    Recent Labs     09/14/20  1137   *   K 4.0   CL 96*   CO2 24   BUN 10   CREATININE 0.8       Estimated Creatinine Clearance: 116 mL/min (based on SCr of 0.8 mg/dL). Recent Labs     09/14/20  1137   WBC 14.0*   HGB 12.8*   HCT 38.9*   MCV 75.1*          Height:  6' 1\" (185.4 cm)  Weight: 235 lb (106.6 kg)    Micro:  No microbiology labs for now. Plan for eventual surgery, anticipate wound cultures. Assessment/Plan:  1. RLE cellulitis:  vancomycin day #1, Zosyn day #1  Vancomycin  · Will start 1.75g q12h. Provides ~ 16 mg/kg and is based on a half-life elimination of 7-8 hours. · Clinical pharmacist will follow-up in AM.  · Renal function will be monitored closely and dosing will be adjusted as appropriate. Please call with any questions. Thank you for consulting pharmacy!   Geni Mccabe PharmD, [facility] 9/14/2020 5:35 PM  [phone]

## 2020-09-14 NOTE — CONSULTS
Clinical Pharmacy Progress Note  Pharmacy to assist with Home Medication information    Admit date: 9/14/2020     Subjective/Objective:  Patient is a 65yr old male from home admitted with diabetic foot workup and treatment. Pharmacy has been asked by Dr. Donnie Mckinley to assist with obtaining home medication information. Assessment/Plan:    1. Home Medication information:    · Spoke with the patient. He was unable to tell specifics about his home meds, but asked that his pharmacy be called. He states he is seen at the [address] [place] [address]. · [facility] is now up to date. A Perfect Serve message has been sent to Dr. Donnie Mckinley to discuss the updated [address] List.      Thank you, please call with questions.    Lm Eastman PharmD., [facility]   9/14/2020 3:31 PM  Wireless: [phone]

## 2020-09-14 NOTE — BRIEF OP NOTE
Brief Postoperative Note      Patient: Ezequiel Arguello  YOB: 1955  MRN: [id number]    Date of Procedure: 9/14/2020    Pre-Op Diagnosis: OSTEOMYLITIS RIGHT HALLUX WITH RIGHT HALLUX PARTIAL AMPUTATION    Post-Op Diagnosis: Same       Procedure(s):  AMPUTATION RIGHT HALLUX    Surgeon(s):  Dhiraj Branham DPM    Assistant:  Resident: Katrina Dhaliwal DPM    Anesthesia: General    Hemostasis: Esmarch bandage ankle tourniquet     Injectables: 20cc of 0.5% marcaine plain post-operatively    Materials: 1/4inch iodoform packing, 3-0 nylon     Estimated Blood Loss (mL): Minimal    Complications: None    Specimens:   ID Type Source Tests Collected by Time Destination   A : A. RIGHT HALLUX Tissue Tissue SURGICAL PATHOLOGY [place], [place] 9/14/2020 1812        Implants:  * No implants in log *      Drains: * No LDAs found *    Findings: Necrotic tissue noted to the plantar lateral aspect of the right hallux extending proximally to the level of the proximal phalanx. Distal phalanx and head of the proximal phalanx noted to the soft, crumbly and discolored, consistent with pathologic bone. 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 This is the first procedure in a staged procedure. Return to the floor for continued broad spectrum IV antibiotics. Plan to return to OR Wednesday for another incision and drainage and delayed primary closure.      Electronically signed by Katrina Dhaliwal DPM on 9/14/2020 at 6:55 PM

## 2020-09-14 NOTE — CONSULTS
warm to warm from proximal to distal with no focal calor noted and skin temperature to right LE > left LE. Nonpitting edema noted to right LE. Tenderness with calf compression of left LE.     NEUROLOGIC: Gross and epicritic sensation is diminished b/l. Protective sensation is diminished at all pedal sites b/l. DERMATOLOGIC: Full thickness ulceration noted to the plantar medial hallux of the right lower extremity. Wound measures approximately 0.6 cm x 0.6 cm x 2cm. Wound tracks plantarly and laterally across entirety of digit. Malodor noted. Grey watery discharge expressed from the wound. Fluctuance and crepitus noted to the plantar aspect of the right hallux. Does not probe to bone. Erythema and edema noted to the hallux extending proximally to the ankle. Webspaces 1-4 b/l are clean, dry, and intact. No hyperkeratosis noted. No subcutaneous nodules, rashes, or other skin lesions noted. Images from 9/14. Verbal consent obtained for all images taken today. MUSCULOSKELETAL: Muscle strength is 5/5 for all pedal groups tested. Diffuse pain with palpation of the right foot and leg. Ankle joint ROM is decreased in dorsiflexion with the knee extended. No obvious biomechanical abnormalities. IMAGING:  Narrative    RIGHT FOOT RADIOGRAPHS 3 VIEWS.         INDICATION: Infection.         COMPARISON: None.         FINDINGS:         Soft tissue swelling, ulceration comments encase emphysema of the great toe at level of the distal phalanx and to lesser extent proximal phalanx. Mildly irregular cortex and periosteal reaction of the tuft of the distal phalanx of the great toe. Os    peroneum. Degenerative change of the first metatarsophalangeal joint. Calcaneal enthesophyte formation.              Impression         1. Soft tissue ulceration of the great toe ascites emphysema and with some mild irregularity of the cortical contour of the distal phalanx suspicious for acute osteomyelitis.       Narrative Vascular Lower Extremities DVT Study Procedure         -- PRELIMINARY SONOGRAPHER REPORT --          Demographics          Patient Name       ATUL MCNAMARA          Date of Study      09/14/2020         Gender              Male          Patient Number     [id number]         Date of Birth       1955          Visit Number       [id number]          Age                 64 year(s)          Accession Number   [id number]         Room Number         B15          Corporate ID       [id number]            Sonographer         Olga Rosenthal                                                               BS, RVT          Ordering Physician Brandon Murphy  Interpreting        Yarsanismquita CORTES MD             Physician           Readers         Procedure         Type of Study:          Veins:Lower Extremities DVT Study, VL EXTREMITY VENOUS DUPLEX RIGHT.         Tech Comments    Right    There is no evidence of deep or superficial venous thrombosis involving the    right lower extremity. Left    There is no evidence of deep venous thrombosis involving the common femoral    vein. IMPRESSION/RECOMMENDATIONS:    1. Gas gangrene; right hallux  2. Acute osteomyelitis; distal phalanx, right hallux  3. Full thickness ulceration; right hallux- Sharp 3  4. Cellulitis; right LE  5.  Diabetes mellitus with peripheral neuropathy    -Patient examined and evaluated at bedside in the ED.   -VSS, leukocytosis noted (WBC 14.0)  -CRP- 204.8, ESR 98  -XR right foot reviewed- impression noted above- soft tissue emphysema with irregularity of right hallux suspicious for acute osteomyelitis  -VL venous Duplex reviewed- impression noted above- negative for DVT  -Wound packed with iodoform, and dressed with betadine soaked gauze, and kerlix  -Wound culture obtained- pending  -PT/INR ordered  -EKG, Chest XR ordered for pre-op clearance  -Recommend admission for surgical intervention and IV antibiotics- vanc+zosyn  -Plan for surgical intervention this evening  -Discussed surgical intervention with patient at bedside. All potential risks, benefits, and complications were discussed with the patient prior to the scheduling of surgery. No guarantees or promises were made to what the outcomes will be. The patient wished to proceed with surgery, and informed written consent was obtained, signed, and placed on the patient's chart. -NPO effective immediately  -Anticoagulation held until tomorrow    Dispo: Gas gangrene, acute osteomyelitis; right hallux. Recommend admission for surgical intervention and broad spectrum IV antibiotics. Surgical clearance by medicine. Plan for surgical intervention this evening. NPO effective immediately. Anticoagulation held until tomorrow morning. Consent to be obtained.      - The patient will be staffed with Dr. Butler Lesch, DPM Orene Mannheim, DPM  Podiatric Resident, PGY-1  Pager #: [phone] or Silviano Alonso

## 2020-09-14 NOTE — ED PROVIDER NOTES
[address] ENCOUNTER          ATTENDING PHYSICIAN NOTE       Date of evaluation: 9/14/2020    Chief Complaint     Leg Swelling and Foot Ulcer      History of Present Illness     Olesya aDrling is a 75 y.o. male who presents right leg and right foot swelling. Patient has a history of diabetes and cannot give me a timeline when he started having problems with his right foot. States pain level 10 out of 10 primarily the top of his foot and lower leg. It is a dull pain. He denies any fevers. He does have a history of diabetes but is not taking any medications at this time. Review of Systems     Review of Systems   Constitutional: Negative for chills and fever. HENT: Negative for congestion, facial swelling and trouble swallowing. Eyes: Negative for discharge and visual disturbance. Respiratory: Negative for apnea. Cardiovascular: Negative for palpitations and leg swelling. Gastrointestinal: Negative for abdominal distention and vomiting. Musculoskeletal: Positive for arthralgias, gait problem and joint swelling. Skin: Positive for wound. Negative for rash. Neurological: Negative for speech difficulty and weakness. Psychiatric/Behavioral: Negative for behavioral problems and confusion. All other systems reviewed and are negative. Past Medical, Surgical, Family, and Social History     He has a past medical history of Diabetes mellitus ([facility] [address] He has no past surgical history on file. His family history is not on file. He reports that he has been smoking. He has never used smokeless tobacco. He reports previous alcohol use. He reports previous drug use. Medications     Previous Medications    No medications on file       Allergies     He has No Known Allergies. Physical Exam     INITIAL VITALS: BP: (!) 151/90, Temp: 98.4 °F (36.9 °C), Pulse: 84, Resp: 18, SpO2: 96 %   Physical Exam  Vitals signs and nursing note reviewed.    Constitutional: General: He is not in acute distress. Appearance: He is well-developed. Comments:   BP (!) 152/79   Pulse 84   Temp 98.4 °F (36.9 °C) (Oral)   Resp 18   Ht 6' 1\" (1.854 m)   Wt 235 lb (106.6 kg)   SpO2 99%   BMI 31.00 kg/m²     Unkempt male with a bedbug that came out of his pants leg. HENT:      Head: Normocephalic and atraumatic. Right Ear: External ear normal.      Left Ear: External ear normal.      Nose: Nose normal.   Eyes:      General:         Right eye: No discharge. Left eye: No discharge. Neck:      Musculoskeletal: Normal range of motion and neck supple. Musculoskeletal:      Comments: Right great toe reveals a necrotic distal tip. There is also ulcers on second third toes. He has redness swelling over the dorsum of this foot and redness that extends up his lower anterior shin. Skin:     General: Skin is warm and dry. Neurological:      Mental Status: He is alert and oriented to person, place, and time. Psychiatric:         Mood and Affect: Mood normal.         Behavior: Behavior normal.         Diagnostic Results         RADIOLOGY:  XR FOOT RIGHT (MIN 3 VIEWS)   Final Result      1. Soft tissue ulceration of the great toe ascites emphysema and with some mild irregularity of the cortical contour of the distal phalanx suspicious for acute osteomyelitis.       VL Extremity Venous Right             LABS:   Results for orders placed or performed during the hospital encounter of 09/14/20   CBC auto differential   Result Value Ref Range    WBC 14.0 (H) 4.0 - 11.0 K/uL    RBC 5.17 4.20 - 5.90 M/uL    Hemoglobin 12.8 (L) 13.5 - 17.5 g/dL    Hematocrit 38.9 (L) 40.5 - 52.5 %    MCV 75.1 (L) 80.0 - 100.0 fL    MCH 24.7 (L) 26.0 - 34.0 pg    MCHC 32.9 31.0 - 36.0 g/dL    RDW 15.4 12.4 - 15.4 %    Platelets 397 533 - 867 K/uL    MPV 7.8 5.0 - 10.5 fL    Neutrophils % 80.0 %    Lymphocytes % 8.8 %    Monocytes % 9.0 %    Eosinophils % 1.6 %    Basophils % 0.6 %    Neutrophils Absolute 11.2 (H) 1.7 - 7.7 K/uL    Lymphocytes Absolute 1.2 1.0 - 5.1 K/uL    Monocytes Absolute 1.3 0.0 - 1.3 K/uL    Eosinophils Absolute 0.2 0.0 - 0.6 K/uL    Basophils Absolute 0.1 0.0 - 0.2 K/uL   Comprehensive Metabolic Panel w/ Reflex to MG   Result Value Ref Range    Sodium 131 (L) 136 - 145 mmol/L    Potassium reflex Magnesium 4.0 3.5 - 5.1 mmol/L    Chloride 96 (L) 99 - 110 mmol/L    CO2 24 21 - 32 mmol/L    Anion Gap 11 3 - 16    Glucose 315 (H) 70 - 99 mg/dL    BUN 10 7 - 20 mg/dL    CREATININE 0.8 0.8 - 1.3 mg/dL    GFR Non-African American >60 >60    GFR African American >60 >60    Calcium 9.1 8.3 - 10.6 mg/dL    Total Protein 7.8 6.4 - 8.2 g/dL    Alb 3.1 (L) 3.4 - 5.0 g/dL    Albumin/Globulin Ratio 0.7 (L) 1.1 - 2.2    Total Bilirubin 0.7 0.0 - 1.0 mg/dL    Alkaline Phosphatase 117 40 - 129 U/L    ALT 26 10 - 40 U/L    AST 21 15 - 37 U/L    Globulin 4.7 g/dL   Lactate, plasma   Result Value Ref Range    Lactic Acid 1.1 0.4 - 2.0 mmol/L   Blood gas, venous (Lab)   Result Value Ref Range    pH, El 7.425 7.350 - 7.450    pCO2, El 40.0 (L) 41.0 - 51.0 mmHg    pO2, El 44.3 (H) 25.0 - 40.0 mmHg    HCO3, Venous 25.7 24.0 - 28.0 mmol/L    Base Excess, El 1.8 -2.0 - 3.0 mmol/L    O2 Sat, El 80 Not established %    Carboxyhemoglobin 3.5 (H) 0.0 - 1.5 %    MetHgb, El 0.5 0.0 - 1.5 %    TC02 (Calc), El 27 mmol/L    Hemoglobin, El, Reduced 19.40 %   C-reactive protein   Result Value Ref Range    .8 (H) 0.0 - 5.1 mg/L   Sedimentation Rate   Result Value Ref Range    Sed Rate 98 (H) 0 - 20 mm/Hr           RECENT VITALS:  BP: (!) 152/79,Temp: 98.4 °F (36.9 °C), Pulse: 84, Resp: 18, SpO2: 99 %     Procedures         ED Course     Nursing Notes, Past Medical Hx, Past Surgical Hx, Social Hx,Allergies, and Family Hx were reviewed.     patient was given the following medications:  Orders Placed This Encounter   Medications    piperacillin-tazobactam (ZOSYN) 3.375 g in dextrose 5 % 100 mL IVPB (mini-bag)  vancomycin (VANCOCIN) 1,750 mg in dextrose 5 % 250 mL IVPB    piperacillin-tazobactam (ZOSYN) 3.375 g in dextrose 5 % 100 mL IVPB extended infusion (mini-bag)    sodium chloride flush 0.9 % injection 10 mL    sodium chloride flush 0.9 % injection 10 mL    potassium chloride 10 mEq/100 mL IVPB (Peripheral Line)    magnesium sulfate 1 g in dextrose 5% 100 mL IVPB    OR Linked Order Group     acetaminophen (TYLENOL) tablet 650 mg     acetaminophen (TYLENOL) suppository 650 mg    sennosides-docusate sodium (SENOKOT-S) 8.6-50 MG tablet 1 tablet    magnesium hydroxide (MILK OF MAGNESIA) 400 MG/5ML suspension 30 mL    OR Linked Order Group     promethazine (PHENERGAN) tablet 12.5 mg     ondansetron (ZOFRAN) injection 4 mg    famotidine (PEPCID) tablet 20 mg    enoxaparin (LOVENOX) injection 40 mg    glucose (GLUTOSE) 40 % oral gel 15 g    dextrose 50 % IV solution    glucagon (rDNA) injection 1 mg    dextrose 5 % solution    insulin glargine (LANTUS;BASAGLAR) injection pen 10 Units    insulin lispro (1 Unit Dial) 9 Units    insulin lispro (1 Unit Dial) 0-6 Units    insulin lispro (1 Unit Dial) 0-3 Units       CONSULTS:  IP CONSULT TO PODIATRY  PHARMACY TO DOSE VANCOMYCIN  IP CONSULT TO HOSPITALIST  PHARMACY TO DOSE VANCOMYCIN    MEDICAL DECISIONMAKING / ASSESSMENT / Genesis Lance is a 75 y.o. male presents with diabetic foot ulcers. Patient did have gas in the soft tissue of his right great toe on x-ray. He also has an elevated white count. His blood sugar was elevated 315 but no evidence for diabetic ketoacidosis. I ordered vancomycin and Zosyn for this patient. Also consulted podiatry. Patient was admitted by the hospitalist.      Clinical Impression     1. Cellulitis of right lower extremity        Disposition     PATIENT REFERRED TO:  No follow-up provider specified.     DISCHARGE MEDICATIONS:  New Prescriptions    No medications on file       DISPOSITION patient admitted to the hospitalist.       Catrachito Rojas MD  09/14/20 [id number]

## 2020-09-14 NOTE — PROGRESS NOTES
4 Eyes Admission Assessment     I agree as the admission nurse that 2 RN's have performed a thorough Head to Toe Skin Assessment on the patient. ALL assessment sites listed below have been assessed on admission. Areas assessed by both nurses: yes  [x]   Head, Face, and Ears   [x]   Shoulders, Back, and Chest  [x]   Arms, Elbows, and Hands   [x]   Coccyx, Sacrum, and Ischium  [x]   Legs, Feet, and Heels        Does the Patient have Skin Breakdown? Yes a wound was noted on the Admission Assessment and an LDA was Initiated documentation include the [facility]-wound, Wound Assessment, Measurements, Dressing Treatment, Drainage, and Color\",  Patient had flaky dry skin all over. Wound on left heal and bilateral toes.           Ashwin Prevention initiated:  Yes   Wound Care Orders initiated:  No podietry Patient      [id number]  nurse consulted for Pressure Injury (Stage 3,4, Unstageable, DTI, NWPT, and Complex wounds) or Ashwin score 18 or lower:  No      Nurse 1 eSignature: Electronically signed by Chi Gamino RN on 9/14/20 at 5:45 PM EDT    **SHARE this note so that the co-signing nurse is able to place an eSignature**    Nurse 2 eSignature: Electronically signed by Trina Hall RN on 9/14/20 at 5:50 PM EDT

## 2020-09-14 NOTE — OP NOTE
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 RIGHT HALLUX Tissue Tissue SURGICAL PATHOLOGY [place], [place] 9/14/2020 1812          Implants:  * No implants in log *      Drains: * No LDAs found *     Findings: Necrotic tissue noted to the plantar lateral aspect of the right hallux extending proximally to the level of the proximal phalanx. Distal phalanx and head of the proximal phalanx noted to the soft, crumbly and discolored, consistent with pathologic bone.      Detailed Description of Procedure:   INDICATIONS FOR PROCEDURE: This patient has signs and symptoms clinically  consistent with the above mentioned preoperative diagnosis. Given the nature of the infection, it was determined that the patient would benefit from surgical intervention. All potential risks, benefits, and complications were discussed with the patient prior to the scheduling of surgery. All the patient's questions were answered and no guarantees were given. The patient wished to proceed with surgery, and informed written consent was obtained. DETAILS OF PROCEDURE: The patient was brought from the pre-operative area and placed on the operating table in the supine position. Following IV sedation, the right  lower extremity was then scrubbed, prepped, and draped in the usual sterile fashion.  A time-out was proximal phalanx was passed from the operative field and placed on the back table to be sent as specimen to pathology. The head of the first metatarsal was noted to the shiny, white, pearly, and hard, all consistent with healthy cartilage. Attention was then directed to the tibial sesamoid where using a rongeur the tibial sesamoid was excised. The medial, lateral and plantar compartments were then explored using Metzenbaum dissecting scissors. There was no moises purulent drainage encountered here at this time. The flexor compartment was explored where there was no moises purulent drainage noted at this time. All necrotic and nonviable tissue was excised down to healthy granular tissue using Metzenbaum dissecting scissors. The wound was then irrigated copiously with a mixture of 3000 mL of normal saline and gentamicin. Upon completion of this hemostasis achieved with electrocauterization. Once hemostasis was achieved the wound was gently packed at the dorsal and plantar compartments with iodoform 1/4 inch Nu Gauze packing material.  Two retention sutures were placed to prevent retraction of the skin flap using 3-0 nylon. 20cc of marcaine plain were then injected in a Mendoza block fashion proximal to the surgical site for patient's post-operative comfort. A sterile dressing consisting of gauze, ABD, stanton, kerlix, and ACE bandage was applied. The patient tolerated the procedure and anesthesia well. The patient left the OR with vital signs stable and vascular status intact to all remaining digits of the left foot. Following a period of postoperative monitoring the patient will be sent back to his in-house room where he will continue to receive IV antibiotic therapy. Once the wound is converted to clean he will be brought back to the operating room for further debridement and delayed primary wound closure. 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  This is the first procedure in a staged procedure. Return to the floor for continued broad spectrum IV antibiotics. Plan to return to OR Wednesday for another incision and drainage and delayed primary closure.      Electronically signed by Sandra Stanley DPM on 9/14/2020 at 7:40 PM

## 2020-09-14 NOTE — ACP (ADVANCE CARE PLANNING)
Advanced Care Planning Note. Purpose of Encounter: Advanced care planning in light of acute and chronic deteriorating medical conditions. Parties In Attendance: Patient Nemesio Marinelli),  Whitley Schwarz MD  (myself)    Decisional Capacity: Yes    Subjective: Patient/family understand in this voluntary conversation that Nemesio Marinelli continues to deteriorate and discuss what inteventions and plans patient would want implemented in light of the following diagnoses:  -R foot ulcer, cellulitis  -T2DM    Objective: Pt has been having chronic decline in functional status with increased dependence on others for ADLs  Increased frequency of Hospitalizations. Likelihood of further hospitalizations with likelihood of escalation of medical interventions with the expectation of returning to a diminishing baseline level of functionality. Discussion highlights: I discussed with patient the ramifications of their acute and chronic medical problems- and the likely outcomes expected, both in terms of statistics, and as part of my experience seeing patients with similar conditions. Goals of Care Determination:  Patient wishes to remain Full code for now, but has interest in continuing this conversation, and discussing with family before making any changes to code status and goals of care parameters. Plan: Continue to educate patient on medical conditions and the choices available regarding possible outcomes with regard to goals of care and code status.        Time spent on Advanced care Plannin minutes    Whitley Schwarz MD  2020 2:09 PM

## 2020-09-14 NOTE — ANESTHESIA PRE PROCEDURE
(GLUTOSE) 40 % oral gel 15 g  15 g Oral PRN Lis Jurado MD        dextrose 50 % IV solution  12.5 g Intravenous PRN Lis Jurado MD        glucagon (rDNA) injection 1 mg  1 mg Intramuscular PRN Lis Jurado MD        dextrose 5 % solution  100 mL/hr Intravenous PRN Lis Jurado MD        insulin glargine (LANTUS;BASAGLAR) injection pen 10 Units  10 Units Subcutaneous Nightly Lis Jurdao MD        insulin lispro (1 Unit Dial) 9 Units  0.08 Units/kg Subcutaneous TID  Lis Jurado MD        insulin lispro (1 Unit Dial) 0-6 Units  0-6 Units Subcutaneous TID  Lis Jurado MD        insulin lispro (1 Unit Dial) 0-3 Units  0-3 Units Subcutaneous Nightly Lis Jurado MD        [START ON 9/15/2020] enoxaparin (LOVENOX) injection 40 mg  40 mg Subcutaneous Daily Jorge Herrera DPM         No current outpatient medications on file. Allergies:  No Known Allergies    Problem List:  There is no problem list on file for this patient. Past Medical History:        Diagnosis Date    Diabetes mellitus ([facility] [address]        Past Surgical History:  History reviewed. No pertinent surgical history.     Social History:    Social History     Tobacco Use    Smoking status: Current Some Day Smoker    Smokeless tobacco: Never Used   Substance Use Topics    Alcohol use: Not Currently                                Ready to quit: Not Answered  Counseling given: Not Answered      Vital Signs (Current):   Vitals:    09/14/20 1101 09/14/20 1130 09/14/20 1200 09/14/20 1230   BP: (!) 151/89 (!) 143/78 136/81 (!) 152/79   Pulse: 84      Resp: 18      Temp: 98.4 °F (36.9 °C)      TempSrc: Oral      SpO2: 97% 99%     Weight: 235 lb (106.6 kg)      Height: 6' 1\" (1.854 m)                                                 BP Readings from Last 3 Encounters:   09/14/20 (!) 152/79   07/31/20 (!) 134/90       NPO Status:                                                                                 BMI:   Wt Readings from Last 3 Encounters:   09/14/20 235 lb (106.6 kg)     Body mass index is 31 kg/m². CBC:   Lab Results   Component Value Date    WBC 14.0 09/14/2020    RBC 5.17 09/14/2020    HGB 12.8 09/14/2020    HCT 38.9 09/14/2020    MCV 75.1 09/14/2020    RDW 15.4 09/14/2020     09/14/2020       CMP:   Lab Results   Component Value Date     09/14/2020    K 4.0 09/14/2020    CL 96 09/14/2020    CO2 24 09/14/2020    BUN 10 09/14/2020    CREATININE 0.8 09/14/2020    GFRAA >60 09/14/2020    AGRATIO 0.7 09/14/2020    LABGLOM >60 09/14/2020    GLUCOSE 315 09/14/2020    PROT 7.8 09/14/2020    CALCIUM 9.1 09/14/2020    BILITOT 0.7 09/14/2020    ALKPHOS 117 09/14/2020    AST 21 09/14/2020    ALT 26 09/14/2020       POC Tests: No results for input(s): POCGLU, POCNA, POCK, POCCL, POCBUN, POCHEMO, POCHCT in the last 72 hours. Coags: No results found for: PROTIME, INR, APTT    HCG (If Applicable): No results found for: PREGTESTUR, PREGSERUM, HCG, HCGQUANT     ABGs: No results found for: PHART, PO2ART, AGH0HJS, XKO5SNA, BEART, [id number]     Type & Screen (If Applicable):  No results found for: LABABO, LABRH    Drug/Infectious Status (If Applicable):  No results found for: HIV, HEPCAB    COVID-19 Screening (If Applicable): No results found for: COVID19      Anesthesia Evaluation  Patient summary reviewed and Nursing notes reviewed no history of anesthetic complications:   Airway: Mallampati: II  TM distance: >3 FB   Neck ROM: full  Mouth opening: > = 3 FB Dental:          Pulmonary:                             ROS comment: Smoker   Cardiovascular:                      Neuro/Psych:   Negative Neuro/Psych ROS              GI/Hepatic/Renal:             Endo/Other:    (+) DiabetesType II DM, , .                 Abdominal:           Vascular:                                      Anesthesia Plan      general     ASA 1    (49-year-old male presents for [facility] ([facility] ).   Plan general anesthesia with ASA standard monitors. Questions answered. Patient agreeable with anesthetic plan.  )  Induction: intravenous. Anesthetic plan and risks discussed with patient. Plan discussed with CRNA.     Attending anesthesiologist reviewed and agrees with Pre Eval content        Leelee August MD   9/14/2020

## 2020-09-14 NOTE — PROGRESS NOTES
Sister, Fransisco Putnam, to bedside in PACU to briefly visit patient. Report called to [facility] on [address].

## 2020-09-14 NOTE — H&P
Hospital Medicine History & Physical      PCP: Puma Winkler    Date of Admission: 9/14/2020    Date of Service: 9/14/2020    Pt seen/examined on 9/14/2020  Admitted to Inpatient with expected LOS greater than two midnights due to medical therapy. Chief Complaint:  R leg swelling    History Of Present Illness:      75 y.o. male who presented to [facility]. with a RLE wound that began with some swelling several weeks ago. It progressed to pain, redness, warmth, and swelling over the intervening time and developed an ulcer that was draining purulent material. Nothing makes it better or worse. He has DM and has been compliant w/ medications. Past Medical History:          Diagnosis Date    Diabetes mellitus ([place] [address]        Past Surgical History:      History reviewed. No pertinent surgical history. Medications Prior to Admission:      Prior to Admission medications    Not on File       Allergies:  Patient has no known allergies. Social History:      TOBACCO:   reports that he has been smoking. He has never used smokeless tobacco.  ETOH:   reports previous alcohol use. Family History:      Reviewed in detail and negative except as follows:    History reviewed. No pertinent family history. REVIEW OF SYSTEMS:   Pertinent positives and negatives as noted in the HPI. All other systems reviewed and negative. PHYSICAL EXAM PERFORMED:    BP (!) 152/79   Pulse 84   Temp 98.4 °F (36.9 °C) (Oral)   Resp 18   Ht 6' 1\" (1.854 m)   Wt 235 lb (106.6 kg)   SpO2 99%   BMI 31.00 kg/m²     General appearance:  No apparent distress, appears stated age  Eyes: Pupils equal, round, reactive to light, conjunctiva/corneas clear  Ears/Nose/Mouth/Throat: No external lesions or scars, hearing intact to voice  Neck: Trachea midline, no masses noted, no thyromegaly  Respiratory:  Normal respiratory effort.  Clear to auscultation bilaterally  Cardiovascular: Regular rate and rhythm, nl S1/S2, w/o murmurs, rubs or gallops, no lower extremity edema  Abdomen: Soft, non-tender, non-distended, no hepatosplenomegaly  Musculoskeletal: No cyanosis, clubbing or petechiae, no lower extremity misalignment, asymmetry, or crepitation  Skin: Normal skin color, texture, turgor. R foot wrapped w/ purulent drainage evident through the bandage. Warmth, redness, tenderness extending proximally  Psychiatric: Alert and oriented x4, good insight and judgment    Labs:     Recent Labs     09/14/20  1137   WBC 14.0*   HGB 12.8*   HCT 38.9*        Recent Labs     09/14/20  1137   *   K 4.0   CL 96*   CO2 24   BUN 10   CREATININE 0.8   CALCIUM 9.1     Recent Labs     09/14/20  1137   AST 21   ALT 26   BILITOT 0.7   ALKPHOS 117     No results for input(s): INR in the last 72 hours. No results for input(s): Steph Caul in the last 72 hours. Urinalysis:    No results found for: Carnella Trinh, BACTERIA, RBCUA, BLOODU, SPECGRAV, [address]    Radiology:     XR FOOT RIGHT (MIN 3 VIEWS)   Final Result      1. Soft tissue ulceration of the great toe ascites emphysema and with some mild irregularity of the cortical contour of the distal phalanx suspicious for acute osteomyelitis. VL Extremity Venous Right             ASSESSMENT:    There are no active hospital problems to display for this patient.   # Lower extremity ulcer and cellulitis  # T2DM    PLAN:    # Lower extremity ulcer and cellulitis  -duplex ruled out DVT  -podiatry consulted  -blood/wound cultures sent  -Vancomycin/zosyn  -NPO for possible procedure    # T2DM  -carb control diet when able  -will start low dose lantus  -mealtime and correctional insulin    DVT Prophylaxis: Lovenox  Diet: Diet NPO Effective Now  Code Status: Full Code    PT/OT Eval Status: deferred    Dispo - inpt, pending clinical improvement, need for IV AB, podiatry intervention and clearance       Carlos Calderón MD    Thank you Jose Sparks for the opportunity to be involved in this

## 2020-09-14 NOTE — ED TRIAGE NOTES
Patient is a 75year old male who presents to the ED from home with c/o leg swelling and diabetic foot ulcer to the right great toe. Patient is alert and oriented with even and unlabored respirations.

## 2020-09-15 ENCOUNTER — ANESTHESIA EVENT (OUTPATIENT)
Dept: OPERATING ROOM | Age: 65
DRG: 616 | End: 2020-09-15
Payer: MEDICARE

## 2020-09-15 LAB
ANION GAP SERPL CALCULATED.3IONS-SCNC: 9 MMOL/L (ref 3–16)
BUN BLDV-MCNC: 9 MG/DL (ref 7–20)
CALCIUM SERPL-MCNC: 8.7 MG/DL (ref 8.3–10.6)
CHLORIDE BLD-SCNC: 100 MMOL/L (ref 99–110)
CO2: 26 MMOL/L (ref 21–32)
CREAT SERPL-MCNC: 0.9 MG/DL (ref 0.8–1.3)
ESTIMATED AVERAGE GLUCOSE: 289.1 MG/DL
GFR AFRICAN AMERICAN: >60
GFR NON-AFRICAN AMERICAN: >60
GLUCOSE BLD-MCNC: 240 MG/DL (ref 70–99)
GLUCOSE BLD-MCNC: 241 MG/DL (ref 70–99)
GLUCOSE BLD-MCNC: 241 MG/DL (ref 70–99)
GLUCOSE BLD-MCNC: 291 MG/DL (ref 70–99)
GLUCOSE BLD-MCNC: 312 MG/DL (ref 70–99)
HBA1C MFR BLD: 11.7 %
HCT VFR BLD CALC: 38.1 % (ref 40.5–52.5)
HEMOGLOBIN: 12.6 G/DL (ref 13.5–17.5)
MCH RBC QN AUTO: 24.9 PG (ref 26–34)
MCHC RBC AUTO-ENTMCNC: 33.1 G/DL (ref 31–36)
MCV RBC AUTO: 75 FL (ref 80–100)
PDW BLD-RTO: 15.3 % (ref 12.4–15.4)
PERFORMED ON: ABNORMAL
PLATELET # BLD: 288 K/UL (ref 135–450)
PMV BLD AUTO: 7.8 FL (ref 5–10.5)
POTASSIUM REFLEX MAGNESIUM: 4.2 MMOL/L (ref 3.5–5.1)
RBC # BLD: 5.08 M/UL (ref 4.2–5.9)
SODIUM BLD-SCNC: 135 MMOL/L (ref 136–145)
WBC # BLD: 11.6 K/UL (ref 4–11)

## 2020-09-15 PROCEDURE — 80048 BASIC METABOLIC PNL TOTAL CA: CPT

## 2020-09-15 PROCEDURE — 2580000003 HC RX 258: Performed by: PODIATRIST

## 2020-09-15 PROCEDURE — 97530 THERAPEUTIC ACTIVITIES: CPT

## 2020-09-15 PROCEDURE — 6360000002 HC RX W HCPCS: Performed by: PODIATRIST

## 2020-09-15 PROCEDURE — 85027 COMPLETE CBC AUTOMATED: CPT

## 2020-09-15 PROCEDURE — 97166 OT EVAL MOD COMPLEX 45 MIN: CPT

## 2020-09-15 PROCEDURE — 97535 SELF CARE MNGMENT TRAINING: CPT

## 2020-09-15 PROCEDURE — 6370000000 HC RX 637 (ALT 250 FOR IP): Performed by: PODIATRIST

## 2020-09-15 PROCEDURE — 94150 VITAL CAPACITY TEST: CPT

## 2020-09-15 PROCEDURE — 36415 COLL VENOUS BLD VENIPUNCTURE: CPT

## 2020-09-15 PROCEDURE — 6370000000 HC RX 637 (ALT 250 FOR IP): Performed by: INTERNAL MEDICINE

## 2020-09-15 PROCEDURE — 1200000000 HC SEMI PRIVATE

## 2020-09-15 PROCEDURE — 97162 PT EVAL MOD COMPLEX 30 MIN: CPT

## 2020-09-15 RX ORDER — OXYCODONE HYDROCHLORIDE AND ACETAMINOPHEN 5; 325 MG/1; MG/1
2 TABLET ORAL EVERY 4 HOURS PRN
Status: DISCONTINUED | OUTPATIENT
Start: 2020-09-15 | End: 2020-09-17

## 2020-09-15 RX ORDER — OXYCODONE HYDROCHLORIDE AND ACETAMINOPHEN 5; 325 MG/1; MG/1
1 TABLET ORAL EVERY 4 HOURS PRN
Status: DISCONTINUED | OUTPATIENT
Start: 2020-09-15 | End: 2020-09-17

## 2020-09-15 RX ORDER — SODIUM CHLORIDE, SODIUM LACTATE, POTASSIUM CHLORIDE, CALCIUM CHLORIDE 600; 310; 30; 20 MG/100ML; MG/100ML; MG/100ML; MG/100ML
INJECTION, SOLUTION INTRAVENOUS CONTINUOUS
Status: CANCELLED | OUTPATIENT
Start: 2020-09-15

## 2020-09-15 RX ADMIN — PIPERACILLIN AND TAZOBACTAM 3.38 G: 3; .375 INJECTION, POWDER, LYOPHILIZED, FOR SOLUTION INTRAVENOUS at 20:07

## 2020-09-15 RX ADMIN — INSULIN LISPRO 9 UNITS: 100 INJECTION, SOLUTION INTRAVENOUS; SUBCUTANEOUS at 08:31

## 2020-09-15 RX ADMIN — VANCOMYCIN HYDROCHLORIDE 1750 MG: 10 INJECTION, POWDER, LYOPHILIZED, FOR SOLUTION INTRAVENOUS at 01:08

## 2020-09-15 RX ADMIN — DOCUSATE SODIUM 50 MG AND SENNOSIDES 8.6 MG 1 TABLET: 8.6; 5 TABLET, FILM COATED ORAL at 20:07

## 2020-09-15 RX ADMIN — OXYCODONE HYDROCHLORIDE AND ACETAMINOPHEN 2 TABLET: 5; 325 TABLET ORAL at 17:46

## 2020-09-15 RX ADMIN — PIPERACILLIN AND TAZOBACTAM 3.38 G: 3; .375 INJECTION, POWDER, LYOPHILIZED, FOR SOLUTION INTRAVENOUS at 03:57

## 2020-09-15 RX ADMIN — ACETAMINOPHEN 650 MG: 325 TABLET ORAL at 04:06

## 2020-09-15 RX ADMIN — INSULIN GLARGINE 20 UNITS: 100 INJECTION, SOLUTION SUBCUTANEOUS at 23:38

## 2020-09-15 RX ADMIN — INSULIN LISPRO 9 UNITS: 100 INJECTION, SOLUTION INTRAVENOUS; SUBCUTANEOUS at 12:08

## 2020-09-15 RX ADMIN — INSULIN LISPRO 3 UNITS: 100 INJECTION, SOLUTION INTRAVENOUS; SUBCUTANEOUS at 08:31

## 2020-09-15 RX ADMIN — INSULIN LISPRO 2 UNITS: 100 INJECTION, SOLUTION INTRAVENOUS; SUBCUTANEOUS at 12:08

## 2020-09-15 RX ADMIN — DOCUSATE SODIUM 50 MG AND SENNOSIDES 8.6 MG 1 TABLET: 8.6; 5 TABLET, FILM COATED ORAL at 08:29

## 2020-09-15 RX ADMIN — INSULIN LISPRO 9 UNITS: 100 INJECTION, SOLUTION INTRAVENOUS; SUBCUTANEOUS at 17:44

## 2020-09-15 RX ADMIN — Medication 10 ML: at 20:07

## 2020-09-15 RX ADMIN — PIPERACILLIN AND TAZOBACTAM 3.38 G: 3; .375 INJECTION, POWDER, LYOPHILIZED, FOR SOLUTION INTRAVENOUS at 12:09

## 2020-09-15 RX ADMIN — INSULIN LISPRO 2 UNITS: 100 INJECTION, SOLUTION INTRAVENOUS; SUBCUTANEOUS at 23:38

## 2020-09-15 RX ADMIN — Medication 10 ML: at 08:30

## 2020-09-15 RX ADMIN — OXYCODONE HYDROCHLORIDE AND ACETAMINOPHEN 1 TABLET: 5; 325 TABLET ORAL at 07:16

## 2020-09-15 RX ADMIN — INSULIN LISPRO 2 UNITS: 100 INJECTION, SOLUTION INTRAVENOUS; SUBCUTANEOUS at 17:44

## 2020-09-15 RX ADMIN — ENOXAPARIN SODIUM 40 MG: 40 INJECTION SUBCUTANEOUS at 08:29

## 2020-09-15 RX ADMIN — OXYCODONE HYDROCHLORIDE AND ACETAMINOPHEN 2 TABLET: 5; 325 TABLET ORAL at 12:24

## 2020-09-15 RX ADMIN — FAMOTIDINE 20 MG: 20 TABLET, FILM COATED ORAL at 08:29

## 2020-09-15 RX ADMIN — VANCOMYCIN HYDROCHLORIDE 1750 MG: 10 INJECTION, POWDER, LYOPHILIZED, FOR SOLUTION INTRAVENOUS at 16:09

## 2020-09-15 RX ADMIN — FAMOTIDINE 20 MG: 20 TABLET, FILM COATED ORAL at 20:07

## 2020-09-15 ASSESSMENT — PAIN DESCRIPTION - FREQUENCY
FREQUENCY: CONTINUOUS

## 2020-09-15 ASSESSMENT — PAIN DESCRIPTION - ONSET
ONSET: ON-GOING

## 2020-09-15 ASSESSMENT — PAIN SCALES - GENERAL
PAINLEVEL_OUTOF10: 3
PAINLEVEL_OUTOF10: 5
PAINLEVEL_OUTOF10: 3
PAINLEVEL_OUTOF10: 0
PAINLEVEL_OUTOF10: 3
PAINLEVEL_OUTOF10: 6
PAINLEVEL_OUTOF10: 9
PAINLEVEL_OUTOF10: 9
PAINLEVEL_OUTOF10: 0
PAINLEVEL_OUTOF10: 2

## 2020-09-15 ASSESSMENT — PAIN DESCRIPTION - LOCATION
LOCATION: TOE (COMMENT WHICH ONE)

## 2020-09-15 ASSESSMENT — PAIN DESCRIPTION - DESCRIPTORS
DESCRIPTORS: SHARP;ACHING
DESCRIPTORS: ACHING;SHARP
DESCRIPTORS: SHARP

## 2020-09-15 ASSESSMENT — PAIN DESCRIPTION - PAIN TYPE
TYPE: ACUTE PAIN

## 2020-09-15 ASSESSMENT — PAIN DESCRIPTION - PROGRESSION
CLINICAL_PROGRESSION: GRADUALLY WORSENING

## 2020-09-15 ASSESSMENT — PAIN DESCRIPTION - ORIENTATION
ORIENTATION: RIGHT

## 2020-09-15 NOTE — PLAN OF CARE
Nutrition Problem #1: Increased nutrient needs  Intervention: Food and/or Nutrient Delivery: Continue Current Diet, Start Oral Nutrition Supplement  Nutritional Goals: Pt will consume >/=50% of meals and ONS this admission

## 2020-09-15 NOTE — PROGRESS NOTES
PACU Transfer Note    Vitals:    09/14/20 2000   BP: 134/74   Pulse: 76   Resp: 21   Temp: 98.1 °F (36.7 °C)   SpO2: 99%       In: 42 [I.V.:42]  Out: 10     Pain assessment:  none  Pain Level: 0  Patient meets shannon discharge criteria. Transported back to room 5314 by house transport on 2L O2 Nasal cannula.      9/14/2020 8:20 PM

## 2020-09-15 NOTE — PROGRESS NOTES
Clinical Pharmacy Progress Note    Admit date: 9/14/2020     Subjective/Objective:  Pt is a 66yom with PMHx that includes DM who is admitted with Rt LE gas gangrene and OM. Pt underwent Rt hallux amputation shortly after admission on 9/14. Interval update:  Planning for further I&D with [facility] tomorrow. Wound culture pending. Pharmacy is consulted to dose Vancomycin per Dr. Rodney Persaud    Current antibiotics:  Zosyn 3.375g IV EI q8h - day #2  Vancomycin - Pharmacy to dose - day #2   1.75g IV q12h (9/14 - current)      Recent Labs     09/14/20  1137 09/15/20  0513   * 135*   K 4.0 4.2   CL 96* 100   CO2 24 26   BUN 10 9   CREATININE 0.8 0.9   GLUCOSE 315* 241*       Estimated Creatinine Clearance: 104 mL/min (based on SCr of 0.9 mg/dL). Lab Results   Component Value Date    WBC 11.6 (H) 09/15/2020    HGB 12.6 (L) 09/15/2020    HCT 38.1 (L) 09/15/2020    MCV 75.0 (L) 09/15/2020     09/15/2020       Lab Results   Component Value Date    PROTIME 14.0 (H) 09/14/2020    INR 1.20 (H) 09/14/2020       Height:  6' 1\" (185.4 cm)  Weight:  238 lb 12.1 oz (108.3 kg)    Vancomycin Levels:  Trough  9/16 @ 04:00 = pending     Culture results:  Wound (9/14) = pending    Prophylaxis:  VTE:  Enoxaparin  GI:  Famotidine    Vaccination screening:  Pneumonia:  Up to date  Influenza:  Not yet available at [facility]    Assessment/Plan:  1)  RLE gas gangrene and OM, s/p Rt hallux amputation:  Zosyn + Vancomycin - day #2  · Vancomycin - Pharmacy to dose  · Continue 1.75g IV q12h. · Trough has been ordered with dose due 9/16 02:00. Desired trough ~15. · Clinical condition will be monitored closely, and levels will be ordered as clinically indicated.     Please call with questions--  Thanks--  Taqueria Mathis, PharmD, [address] [facility]  [phone] or [phone] ([facility])   9/15/2020 10:59 AM

## 2020-09-15 NOTE — PROGRESS NOTES
Pt A&O x4. VSS. Denies pain. Right foot wrapped in ace wrap. Cap refill < 3 seconds. Right foot propped on pillows. SCD on left foot. Pt IV in right forearm receiving intermittent antibiotics. Pt sipping ice water with no nausea or vomiting. Pt urinating adequately yellow and clear urine. Pt has scattered flaky skin. All needs met at this time. Will continue to monitor.

## 2020-09-15 NOTE — PROGRESS NOTES
[id number] kcal    Estimated Total Protein (g/day) : 1.5-2.0 Ideal Bodyweight  (84 kg) 126-168 g/day  Estimated Daily Total Fluid (ml/day): 1 mL/kcal per day     Food / Nutrition-Related History  Pre-Admission / Home Diet:  Pre-Admission/Home Diet: Carb Control   Home Supplements / Herbals:    none noted  Food Restrictions / Cultural Requests:    none noted    Diet Orders / Intake / Nutrition Support  Current diet/supplement order: DIET CARB CONTROL; Diet NPO, After Midnight  Dietary Nutrition Supplements: Wound Healing Oral Supplement     NSG Recorded PO:   PO Fluids P.O.: 25 mL  PO Meals     PO Intake: %  PO Supplement: None   PO Supplement Intake: None   IVF: none     NUTRITION RISK LEVEL: Risk Level:  Moderate     Elvie Beasley, [address]  [place]:  [phone]  Office:  [phone]

## 2020-09-15 NOTE — PROGRESS NOTES
Occupational Therapy   Occupational Therapy Initial Assessment & Treatment Note  Date: 9/15/2020   Patient Name: Osman Stoll  MRN: [id number]     : 1955    Date of Service: 9/15/2020    Discharge Recommendations: Osman Stoll scored a 17/24 on the AM-PAC ADL Inpatient form. Current research shows that an AM-PAC score of 17 or less is typically not associated with a discharge to the patient's home setting. Based on the patient's AM-PAC score and their current ADL deficits, it is recommended that the patient have 3-5 sessions per week of Occupational Therapy at d/c to increase the patient's independence. Please see assessment section for further patient specific details. If patient discharges prior to next session this note will serve as a discharge summary. Please see below for the latest assessment towards goals. OT Equipment Recommendations  Equipment Needed: No  Other: deffer to next setting    Assessment   Performance deficits / Impairments: Decreased functional mobility ; Decreased endurance;Decreased ADL status; Decreased safe awareness;Decreased cognition;Decreased strength  Assessment: Pt from home with sister. PLOF independent in ADLs and functional mobility. Pt demo decline from baseline for simple ADLs and functional mobility. Pt req mod A x2 for functional tranfers, with mod-max verbal cues for hand placement and adherence to RLE  precautions. Pt verbalized partial understanding of non-weight bearing precautions, however req freq reinforcement. Pt limited 2/2 decreased safety awareness and non-weight bearing precautions to R foot. Pt would benefit from continued inpt OT services to maximize functional independence level. Will follow as inpt.   Treatment Diagnosis: Decreased functional mobility/ ADL status 2/2 decrease safety awareness  Prognosis: Fair;Good  Decision Making: Medium Complexity  OT Education: Plan of Care;OT Role;Transfer Training;Precautions  Patient Education: verbalize fair understanding, needs reinforcement  REQUIRES OT FOLLOW UP: Yes  Activity Tolerance  Activity Tolerance: Patient limited by fatigue  Activity Tolerance: Pt slight limited 2/2 decrease safety awareness  Safety Devices  Safety Devices in place: Yes  Type of devices: All fall risk precautions in place; Left in chair;Call light within reach;Nurse notified; Chair alarm in place           Patient Diagnosis(es): The encounter diagnosis was Cellulitis of right lower extremity. has a past medical history of Diabetes mellitus ([facility] [address] has a past surgical history that includes Toe amputation (Right, 9/14/2020). Treatment Diagnosis: Decreased functional mobiltiy/ ADL status 2/2 decrease safety awareness      Restrictions  Position Activity Restriction  Other position/activity restrictions: non weight bearing R foot (R weight bearing on heal for transfers); post of shoe    Subjective   General  Chart Reviewed: Yes  Patient assessed for rehabilitation services?: Yes  Additional Pertinent Hx: Pt admitted 9/14 for R foot ulcer with unspecific severity. Sx 9/14: toe amputation (R hallux)        PMH: DM  Family / Caregiver Present: No  Diagnosis: R foot ulcer with unspecific severity  Subjective  Subjective: Pt found in bed. Pt agreeable to OT eval and Tx. Patient Currently in Pain: Denies(Simultaneous filing. User may not have seen previous data.)  Vital Signs  Patient Currently in Pain: Denies(Simultaneous filing.  User may not have seen previous data.)  Social/Functional History  Social/Functional History  Lives With: Other (comment)(sister)  Type of Home: Apartment(1st floor)  Home Access: Stairs to enter with rails(5 stairs)  Entrance Stairs - Rails: Right  Bathroom Shower/Tub: Walk-in shower  Bathroom Toilet: Standard(sink next toilet)  Bathroom Equipment: Shower chair  Home Equipment: (none)  Receives Help From: Family  ADL Assistance: Independent  Homemaking Assistance: Needs assistance(sister did IADLs)  Ambulation Assistance: Independent  Active : No  Patient's  Info: sister drives  Occupation: Retired  Type of occupation: clean cars  Additional Comments: Does not normally wear O2, Pt denies falls     Objective  Treatment included functional transfer training, ADL's and pt. education. Vision: Within Functional Limits  Hearing: Within functional limits    Orientation  Overall Orientation Status: Within Functional Limits     Balance  Sitting Balance: Stand by assistance  Standing Balance: Dependent/Total(mod X2)  Standing Balance  Time: ~30 sec x 5  Activity: stance; functional transfers  Comment: Pt observed to have a posterior lean  Functional Mobility  Functional - Mobility Device: Standard Walker(transfer to wheelchair)  Activity: Other(bed<>chair and chair<>wheelchair)  Assist Level: Dependent/Total(mod Ax2)  Functional Mobility Comments: Pt denies dizziness or light headiness. Pt demo 2-3 sideways steps to complete a stand step transfer, with standards walker to chair, with mod A x2. Pt demo 2-3 steps sideways to compete a stand step transfer with standard walker to wheelchair, with mod A x2. Toilet Transfers  Toilet - Technique: Stand pivot  Equipment Used: Standard bedside commode  Toilet Transfer: 2 Person assistance;Dependent/Total(Mod A x2)  Toilet Transfers Comments: v. cues for hand placement  Wheelchair Bed Transfers  Wheelchair/Bed - Technique: Stand pivot(wheelchair<>chair transfer)  Equipment Used: Wheelchair; Other(chair)  Level of Assistance: Moderate assistance  Wheelchair Transfers Comments: Pt demo transfer from wheelchair to chair with Mod A and verbal cues for hand placement  ADL  Feeding: Setup  Grooming: Supervision; Increased time to complete;Setup(sitting in chair)  UE Dressing: Minimal assistance(gown management)  LE Dressing: Moderate assistance(don/doff socks)  Toileting: Moderate assistance;Setup; Increased time to complete(sitting in chair with urinal)  Tone RUE  RUE Tone: Normotonic  Tone LUE  LUE Tone: Normotonic  Coordination  Movements Are Fluid And Coordinated: Yes  Coordination and Movement description: Right UE;Left UE     Bed mobility  Supine to Sit: Minimal assistance(verbal cues for hand placement)  Sit to Supine: Unable to assess(Pt returned to chair)  Scooting: Minimal assistance(min A for scooting in chair)  Comment: HOB elevated, railing raised  Transfers  Sit to stand: Dependent/Total;2 Person assistance(mod A x2)  Stand to sit: 2 Person assistance;Dependent/Total(mod A x2)  Transfer Comments: v. cues for hand placement  Toilet transfer: Stand pivot  Equipment used: [facility]  Toilet transfer: Mod A x2  Commits: verbal cues for hand placement   Wheelchair<>chair transfer  Technique: stand pivot  Equipment used: wheelchair, chair  Level of assist: mod A x1  Comment: Pt demo transfer from wheelchair to chair with Mod A and verbal cues for hand placement       Cognition  Overall Cognitive Status: Exceptions  Arousal/Alertness: Appropriate responses to stimuli  Following Commands: Follows one step commands with repetition; Follows one step commands with increased time  Attention Span: Attends with cues to redirect  Safety Judgement: Decreased awareness of need for assistance;Decreased awareness of need for safety  Problem Solving: Assistance required to correct errors made  Insights: Decreased awareness of deficits  Initiation: Requires cues for some  Sequencing: Requires cues for some  Cognition Comment: Pt demo req mod verbal cues for weight bearing precautions and when asked to repeat precautions, req min verbal cues.     LUE AROM (degrees)  LUE AROM : WFL  Left Hand AROM (degrees)  Left Hand AROM: WFL  RUE AROM (degrees)  RUE AROM : WFL  Right Hand AROM (degrees)  Right Hand AROM: WFL  LUE Strength  Gross LUE Strength: WFL  L Hand General: 4/5  RUE Strength  Gross RUE Strength: WFL  R Hand General: 4/5     Plan   Plan  Times per week: 2-5x  Times per day: Daily  Current Treatment Recommendations: Strengthening, Balance Training, Functional Mobility Training, Endurance Training, Safety Education & Training, Self-Care / ADL, Cognitive/Perceptual Training    AM-PAC Score  AM-PAC Inpatient Daily Activity Raw Score: 17 (09/15/20 1121)  AM-PAC Inpatient ADL T-Scale Score : 37.26 (09/15/20 1121)  ADL Inpatient CMS 0-100% Score: 50.11 (09/15/20 1121)  ADL Inpatient CMS G-Code Modifier : CK (09/15/20 1121)    Goals  Short term goals  Time Frame for Short term goals: at d/c  Short term goal 1: Stance for x5 min while completing an ADL, with supv  Short term goal 2: Functional transfer with supv  Short term goal 3: LE dressing with mod independence, AE prn  Short term goal 4: Identify 2  for home, with less than one verbal cue  Patient Goals   Patient goals : go home       Therapy Time   Individual Concurrent Group Co-treatment   Time In 1016         Time Out 1113         Minutes 57         Timed Code Treatment Minutes:   42 min     Total Treatment Minutes:  62 min    Zia Randle Occupational therapy student     Therapist was present, directed the patients care, made skilled judgement and was responsible for assessment and treatment of the patient.      Helio Mukherjee OTR/L  [phone]

## 2020-09-15 NOTE — PROGRESS NOTES
Pre-Operative Note  Resident Note     Patient: Kamila Wing     Procedure: Incision and drainage with debridement of all necrotic and nonviable soft tissue and bone with possible first ray resection and delayed primary closure; right lower extremity    Consent: In chart     Labs:        Recent Labs     09/15/20  0513 09/16/20  0518   WBC 11.6* 13.9*   HGB 12.6* 12.8*   HCT 38.1* 38.4*   MCV 75.0* 74.5*    339        Recent Labs     09/15/20  0513 09/16/20  0518   * 135*   K 4.2 4.4    100   CO2 26 25   BUN 9 14   CREATININE 0.9 1.0        Recent Labs     09/14/20  1137   AST 21   ALT 26   BILITOT 0.7   ALKPHOS 117      No results for input(s): LIPASE, AMYLASE in the last 72 hours. Recent Labs     09/14/20  1137   PROT 7.8   INR 1.20*      No results for input(s): CKTOTAL, CKMB, CKMBINDEX, TROPONINI in the last 72 hours.     Saline lock/IV access: yes    Clearance for surgery: per medicine     Diet: NPO after midnight    CXR: normal      EKG: please see cardiology section in chart     Echocardiogram: not done    PT/INR: 14/1.20    IVF: yes    Abx: vancomycin, zosyn    Type and Screen: not indicated    Pregnancy test: N/A    Known risk factors for perioperative complications: Diabetes mellitus      Anesthesia to see patient    Yvette Leo DPM  Podiatric Resident, PGY-1  Pager #: [phone] or Perfect Serve

## 2020-09-15 NOTE — PROGRESS NOTES
expressed from the incision site. Mild malodor noted, greatly improved from yesterday. No fluctuance or crepitus noted. Calor noted to the right lower extremity, improved from yesterday. No surrounding erythema noted. Mild nonpitting edema noted to the right lower extremity with improvement noted from yesterday. Images from 9/15. Verbal consent obtained for all images taken today. MUSCULOSKELETAL: Muscle strength deferred due to post-op state. Tenderness with palpation of the right foot. Ankle joint ROM is decreased in dorsiflexion with the knee extended. S/p right hallux amputation. IMAGING:  XR Right foot- s/p hallux amputation (9/14/2020)  Narrative    Exam: Right Foot, 3 views 1927 hours         History: s/p hallux amputation 2/2 gas gangrene         Comparison: 1223 hours         Findings:    The patient is status post amputation of the first digit at the first MTP. Postoperative soft tissue swelling is present. No acute osseous abnormality is seen.              Impression    Impression:    Status post hallux amputation. XR Right foot- pre-operatively (9/14/2020)  Narrative    RIGHT FOOT RADIOGRAPHS 3 VIEWS.         INDICATION: Infection.         COMPARISON: None.         FINDINGS:         Soft tissue swelling, ulceration comments encase emphysema of the great toe at level of the distal phalanx and to lesser extent proximal phalanx. Mildly irregular cortex and periosteal reaction of the tuft of the distal phalanx of the great toe. Os    peroneum. Degenerative change of the first metatarsophalangeal joint. Calcaneal enthesophyte formation.              Impression         1.  Soft tissue ulceration of the great toe ascites emphysema and with some mild irregularity of the cortical contour of the distal phalanx suspicious for acute osteomyelitis.       Narrative    Vascular Lower Extremities DVT Study Procedure         -- PRELIMINARY SONOGRAPHER REPORT --          Demographics       Patient Name       ATUL MCNAMARA          Date of Study      09/14/2020         Gender              Male          Patient Number     [id number]         Date of Birth       1955          Visit Number       [id number]          Age                 64 year(s)          Accession Number   [id number]         Room Number         B15          Corporate ID       [id number]            Sonographer         Joaquim Rosenthal                                                               BS, RVT          Ordering Physician Roby Siemens  Interpreting        Vasiliy CORTES MD             Physician           Readers         Procedure         Type of Study:          Veins:Lower Extremities DVT Study, VL EXTREMITY VENOUS DUPLEX RIGHT.         Tech Comments    Right    There is no evidence of deep or superficial venous thrombosis involving the    right lower extremity. Left    There is no evidence of deep venous thrombosis involving the common femoral    vein. ASSESSMENT/PLAN  1. S/p right hallux amputation 2/2 gas gangrene, acute osteomyelitis  2. Cellulitis; right LE- improving  3.  Diabetes mellitus with peripheral neuropathy    -Patient examined and evaluated this am  -Hypertensive, otherwise VSS, on 2L O2 via NC  -Leukocytosis improving- WBC 11.6 (14.0 on admission)  -.8, ESR 98  -XR imaging reviewed- impressions noted above  -VL venous Duplex reviewed- impression noted above  -Wound culture- 4+GPC, 3+GND, 2+GNR  -Surgical pathology- pending  -PT/INR- 14.0/1.20  -Surgical site dressed with 1/4 inch iodoform, gauze, ABD, kerlix, ACE bandage with gentle compression  -Recommend continue broad spectrum IV antibiotics until sensitives return  -Plan for return to OR tomorrow for incision and drainage with possible first ray resection and delayed primary closure  -NPO at midnight tonight  -Lovenox held at midnight tonight  -Consent to be obtained    DISPO: s/p right hallux amputation 2/2 gas gangrene, acute osteomyelitis. Cellulitis of right lower extremity improving. Continue broad spectrum antibiotics. Plan for return to OR tomorrow for incision and drainage with possible first ray resection and delayed primary closure. NPO at midnight. Anticoagulation held at midnight. Consent to be obtained.      Discussed assessment and plan with Dr. José Malone, ALEXANDRA.    Aby Steiner DPM  Podiatric Resident, PGY-1  Pager #: [phone] or Perfect Serve

## 2020-09-15 NOTE — PLAN OF CARE
Problem: Fluid Volume:  Goal: Will show no signs or symptoms of fluid imbalance  Description: Will show no signs or symptoms of fluid imbalance  9/15/2020 1652 by Johnson James RN  Note: VSS. patient shows no s/s of fluid imbalance, voiding adequately. Will continue to monitor. Problem: Nutrition  Goal: Optimal nutrition therapy  9/15/2020 1652 by Johnson James RN  Note: Patient tolerating carb control diet with no issues and no signs of n/v. Will continue to monitor.

## 2020-09-15 NOTE — CARE COORDINATION
Case Management Assessment           Initial Evaluation                Date / Time of Evaluation: 9/15/2020 4:49 PM                 Assessment Completed by: Scott Burgos    Patient Name: Alma You     YOB: 1955  Diagnosis: Right foot ulcer, with unspecified severity ([address] [L97.519]  Right foot ulcer, with unspecified severity ([address] [L97.519]     Date / Time: 9/14/2020 10:48 AM    Patient Admission Status: Inpatient    If patient is discharged prior to next notation, then this note serves as note for discharge by case management. Current PCP: [address] [place] [address] Patient: No    Chart Reviewed: Yes  Patient/ Family Interviewed: Yes    Initial assessment completed at bedside with: pt    Hospitalization in the last 30 days: No    Emergency Contacts:  Extended Emergency Contact Information  Primary Emergency Contact: Renny Leija, [address] [facility] Phone: [phone]  Relation: Brother/Sister    Advance Directives:   Code Status: Full [address]: No    Financial  Payor: /     Pre-cert required for SNF: No    Pharmacy  No Pharmacies Listed    Potential assistance Purchasing Medications: Potential Assistance Purchasing Medications: No  Does Patient want to participate in local refill/ meds to beds program?: No    Meds To [facility] Rules:  1. Can ONLY be done Monday- Friday between 8:30am-5pm  2. Prescription(s) must be in pharmacy by 3pm to be filled same day  3. Copy of patient's insurance/ prescription drug card and patient face sheet must be sent along with the prescription(s)  4. Cost of Rx cannot be added to hospital bill. If financial assistance is needed, please contact unit  or ;  or  CANNOT provide pharmacy voucher for patients co-pays  5.  Patients can then  the prescription on their way out of the hospital at discharge, or pharmacy can deliver to the bedside if staff is available. (payment due at time of pick-up or delivery - cash, check, or card accepted)     Able to afford home medications/ co-pay costs: No    ADLS  Support Systems: Family Members    PT AM-PAC: 10 /24  OT AM-PAC: 16 /24    [place]: from home with wife  Steps:     Plans to RETURN to current housing: TBD  Barriers to RETURNING to current housing: post surgical recovery    Home Care Information  Currently ACTIVE with [address]: No  Home Care Agency: Not Applicable    Currently ACTIVE with [place] [facility]: No      Durable Medical Equipment  DME Provider: none  Equipment:     Home Oxygen and [address] [place] prior to admission: No  [address]: Not Applicable  Other Respiratory Equipment:       Dialysis  Active with HD/PD prior to admission: No  Nephrologist:     HD Center:  Not Applicable    DISCHARGE PLAN:  Disposition: TBD, likely SNF will be needed Insurance s a barrier    Transportation PLAN for discharge: TBD     Factors facilitating achievement of predicted outcomes: Family support and [place]    Barriers to discharge: Limited insight into deficits, Lower extremity weakness, Medical complications and Wound Care    Additional Case Management Notes: Pt from home with wife, plan for OR on 9/16, will need repeat PT OT yuri post-op for Dc recs. LM for Marita Padilla in public Benefits for [employer]    The Plan for Transition of Care is related to the following treatment goals of Right foot ulcer, with unspecified severity ([address] [L97.519]  Right foot ulcer, with unspecified severity ([address] [L97.519]    The Patient and/or patient representative Jaja Villarreal and his family were provided with a choice of provider and agrees with the discharge plan Yes    Freedom of choice list was provided with basic dialogue that supports the patient's individualized plan of care/goals and shares the quality data associated with the providers.  Not Indicated    Care Transition patient: No    Darvin Scheuermann, RN  [facility]  Case Management Department  Ph: [phone]   Fax: [phone]

## 2020-09-15 NOTE — PROGRESS NOTES
Patient alert and oriented x4. VSS and afebrile. Patient has c/o some pain in RLE treated with prn medication with benefit. Denies any n/v and is tolerating carb control diet with no issues. Patient RLE is ACE wrapped and dressing is CDI. Patient is up in chair and remains non weight bearing on R leg. Voiding in urinal with no issue. Patient denies any other needs at this time. Bed is in the lowest position, call light and bedside table within reach. Patient bed alarm is on. Will continue to monitor for changes in patient status.      Electronically signed by Tariq Gomez RN on 9/15/2020 at 5:02 PM

## 2020-09-15 NOTE — ANESTHESIA POSTPROCEDURE EVALUATION
Department of Anesthesiology  Postprocedure Note    Patient: Zeynep Vizcaino  MRN: [id number]  YOB: 1955  Date of evaluation: 9/14/2020  Time:  8:40 PM     Procedure Summary     Date:  09/14/20 Room / Location:  [address] [place] [address]    Anesthesia Start:  1745 Anesthesia Stop:  1904    Procedure:  AMPUTATION RIGHT HALLUX (Right ) Diagnosis:  ([address] PARTIAL AMPUTATION)    Surgeon:  Dariela Guy DPM Responsible Provider:  Sol Kay MD    Anesthesia Type:  general ASA Status:  3          Anesthesia Type: No value filed. Eric Phase I: Eric Score: 9    Eric Phase II:      Last vitals: Reviewed and per EMR flowsheets.        Anesthesia Post Evaluation    Patient location during evaluation: PACU  Patient participation: complete - patient participated  Level of consciousness: awake and alert  Pain score: 0  Airway patency: patent  Nausea & Vomiting: no nausea and no vomiting  Complications: no  Cardiovascular status: hemodynamically stable  Respiratory status: acceptable  Hydration status: euvolemic

## 2020-09-15 NOTE — PROGRESS NOTES
Physical Therapy    Facility/Department: [facility] [address]  Initial Assessment and treatment     NAME: Jorge Doyle  : 1955  MRN: [id number]    Date of Service: 9/15/2020    Discharge Recommendations:  Jorge Doyle scored a 10/24 on the AM-PAC short mobility form. Current research shows that an AM-PAC score of 17 or less is typically not associated with a discharge to the patient's home setting. Based on the patient's AM-PAC score and their current functional mobility deficits, it is recommended that the patient have 3-5 sessions per week of Physical Therapy at d/c to increase the patient's independence. Please see assessment section for further patient specific details. If patient discharges prior to next session this note will serve as a discharge summary. Please see below for the latest assessment towards goals. Assessment   Body structures, Functions, Activity limitations: Decreased functional mobility ; Decreased strength;Decreased cognition;Decreased safe awareness;Decreased endurance  Assessment: Pt is a 74 y/o male s/p R hallux partial amputation. Pt currently dependent for transfers and is functioning below baseline. Pt demonstrates only partial understanding of WB precautions of surgical foot. Pt required max VCs for transfer safety and constant assist for SW management. Pt would benefit from continued IP PT services. Not safe to d/c home. Treatment Diagnosis: impaired functional mobility with gait and transfers  Prognosis: Good  Decision Making: Medium Complexity  PT Education: PT Role;Plan of Care;Weight-bearing Education;Transfer Training; Adaptive Device Training;Functional Mobility Training  Patient Education: Pt verbalized partial understanding. Reinforement recommended for WB precautions and SW management. REQUIRES PT FOLLOW UP: Yes  Activity Tolerance  Activity Tolerance: Patient limited by fatigue;Patient limited by cognitive status; Patient limited by endurance Patient Diagnosis(es): The encounter diagnosis was Cellulitis of right lower extremity. has a past medical history of Diabetes mellitus ([facility] [address] has a past surgical history that includes Toe amputation (Right, 9/14/2020). Restrictions  Position Activity Restriction  Other position/activity restrictions: non weight bearing R foot (R weight bearing on heal for transfers with post op shoe)  Vision/Hearing  Vision: Within Functional Limits  Hearing: Within functional limits     Subjective  General  Chart Reviewed: Yes  Patient assessed for rehabilitation services?: Yes  Additional Pertinent Hx: Pt presented to [facility] on 9/14 with a R foot ulcer, partial amputation of R hallux performed on 9/14. LE dopplers (-) DVT; PMHx: DM  Family / Caregiver Present: No  Referring Practitioner: Don Escalante MD  Referral Date : 09/14/20  Diagnosis: Right foot ulcer  Follows Commands: Impaired(Pt requires max VCs for transfers and WB precautions)  Subjective  Subjective: Pt found supine in bed. Pt agreeable to PT. Pain Screening  Patient Currently in Pain: Denies(Simultaneous filing. User may not have seen previous data.)  Vital Signs  Patient Currently in Pain: Denies(Simultaneous filing.  User may not have seen previous data.)       Orientation   oriented to self, partially oriented to date, \"hospital\"   Social/Functional History  Social/Functional History  Lives With: Other (comment)(sister)  Type of Home: Apartment(1st floor)  Home Access: Stairs to enter with rails(5 stairs)  Entrance Stairs - Rails: Right  Bathroom Shower/Tub: Walk-in shower  Bathroom Toilet: Standard(sink next toilet)  Bathroom Equipment: Shower chair  Home Equipment: (none)  Receives Help From: Family  ADL Assistance: Independent  Homemaking Assistance: Needs assistance(sister did IADLs)  Ambulation Assistance: Independent  Active : No  Patient's  Info: sister drives  Occupation: Retired  Type of occupation: clean cars  Additional Comments: Does not normally wear O2, Pt denies falls  Cognition        Objective          AROM RLE (degrees)  RLE AROM: (WFL at hip and knee, ROM restricted at ankle by bandage)  AROM LLE (degrees)  LLE AROM : WFL  Strength RLE  Strength RLE: WFL  Comment: 4/5 hip flexion, knee extension, DF not tested d/t amputation  Strength LLE  Strength LLE: WFL  Comment: 4+/5 hip flexion, knee extension, DF        Bed mobility  Rolling to Left: Minimal assistance  Supine to Sit: Minimal assistance(Assist with LEs, HOB elevated, handrail used for UE)  Scooting: Minimal assistance(to EOB)  Transfers  Sit to Stand: 2 Person Assistance;Dependent/Total(Mod A x 2 from EOB to SW and from bedside chair and w/c)  Stand to sit: Moderate Assistance;2 Person Assistance;Dependent/Total  Stand step transfer: mod A x 2 with walker - max assist for walker management for transfer bed to bedside chair  Stand Pivot Transfers: Moderate Assistance;2 Person Assistance;Dependent/Total(Max VCs needed for hand placement and weight shift; performed 2 trials total (bedside chair to w/c; required mod A x 1 for w/c to bedside chair)  Comment: Pt required max VCs for hand placement and walker safety during transfers. Assistance needed for walker stabilization. Pt unable to follow cues to put UE weight through the walker.   Multiple cues provided for heel WB during transfers, post op shoe in place throughout all transfers  Ambulation  Ambulation?: No     Balance  Sitting - Static: Good  Sitting - Dynamic: Good  Standing - Static: Fair  Standing - Dynamic: Poor        Plan   Plan  Times per week: 2-5  Times per day: Daily  Current Treatment Recommendations: Strengthening, Transfer Training, Stair training, Functional Mobility Training, Endurance Training, Wheelchair Mobility Training, Safety Education & Training, Home Exercise Program  Safety Devices  Type of devices: Left in chair, Nurse notified, Gait belt, Chair alarm in place, Call light within reach    G-Code       OutComes Score                                                  AM-PAC Score          Goals  Short term goals  Time Frame for Short term goals: Discharge  Short term goal 1: sit <> supine with SBA  Short term goal 2: sit <> stand CGA to SW  Short term goal 3: Pt will perform pivot transfer from chair to wheelchair with min A x 1 while maintaining WB precautions  Short term goal 4: Pt will verbalize understanding of WB precautions of surgical foot  Patient Goals   Patient goals : to return home       Therapy Time   Individual Concurrent Group Co-treatment   Time In 1016         Time Out 1113         Minutes 57             Timed Code Treatment Minutes:   42    Total Treatment Minutes:  62       Gail Guajardo, [facility]    Therapist was present, directed the patient's care, made skilled judgment, and was responsible for assessment and treatment of the patient.     Radha Looney, PT, DPT  [id number]

## 2020-09-15 NOTE — PLAN OF CARE
Problem: Fluid Volume:  Goal: Will show no signs or symptoms of fluid imbalance  Description: Will show no signs or symptoms of fluid imbalance  Outcome: Ongoing  Note: Pt IV infusing intermittent antibiotics. Pt oral intake adequate. Urinating adequately > 30 mL/hr with yellow and clear urine. No signs or symptoms of fluid imbalance. Problem: Physical Regulation:  Goal: Complications related to the disease process, condition or treatment will be avoided or minimized  Description: Complications related to the disease process, condition or treatment will be avoided or minimized  Outcome: Ongoing  Note: Pt foot dressing is clean, dry, intact. No leakage noted. Pt capillary refill on toes < 3 seconds. No signs or symptoms of complications related to surgery. Problem: Sensory:  Goal: Pain level will decrease  Outcome: Ongoing  Note: Pain level has been 0/10 this evening. Denies pain at this time. Will continue to monitor.

## 2020-09-15 NOTE — PROGRESS NOTES
Hospitalist Progress Note      PCP: Su Carey    Date of Admission: 9/14/2020    Chief Complaint: R leg swelling    Subjective:  Patient seen and examined at the bedside. No complaints at this time. Went to the [address] [place] [address] yesterday w/ podiatry. Plan for delayed primary closure in OR tomorrow. PFHS: reviewed as documented 9/14/2020, no changes    Medications:  Reviewed    Infusion Medications    dextrose       Scheduled Medications    [START ON 9/17/2020] enoxaparin  40 mg Subcutaneous Daily    insulin glargine  20 Units Subcutaneous Nightly    piperacillin-tazobactam  3.375 g Intravenous Q8H    sodium chloride flush  10 mL Intravenous 2 times per day    sennosides-docusate sodium  1 tablet Oral BID    famotidine  20 mg Oral BID    insulin lispro  0.08 Units/kg Subcutaneous TID WC    insulin lispro  0-6 Units Subcutaneous TID WC    insulin lispro  0-3 Units Subcutaneous Nightly    vancomycin  1,750 mg Intravenous Q12H     PRN Meds: oxyCODONE-acetaminophen **OR** oxyCODONE-acetaminophen, sodium chloride flush, potassium chloride, magnesium sulfate, acetaminophen **OR** acetaminophen, magnesium hydroxide, promethazine **OR** ondansetron, glucose, dextrose, glucagon (rDNA), dextrose      Intake/Output Summary (Last 24 hours) at 9/15/2020 0940  Last data filed at 9/15/2020 0659  Gross per 24 hour   Intake 452 ml   Output 785 ml   Net -333 ml         Physical Exam Performed:    /75   Pulse 76   Temp 97.8 °F (36.6 °C) (Oral)   Resp 24   Ht 6' 1\" (1.854 m)   Wt 238 lb 12.1 oz (108.3 kg)   SpO2 97%   BMI 31.50 kg/m²     General appearance:  No apparent distress, appears stated age  Eyes: Pupils equal, round, reactive to light, conjunctiva/corneas clear  Ears/Nose/Mouth/Throat: No external lesions or scars, hearing intact to voice  Neck: Trachea midline, no masses noted, no thyromegaly  Respiratory:  Normal respiratory effort.  Clear to auscultation bilaterally  Cardiovascular: Regular rate and rhythm, nl S1/S2, w/o murmurs, rubs or gallops, no lower extremity edema  Abdomen: Soft, non-tender, non-distended, no hepatosplenomegaly  Musculoskeletal: No cyanosis, clubbing or petechiae, no lower extremity misalignment, asymmetry, or crepitation  Skin: Normal skin color, texture, turgor. R foot wrapped, dressing c/d/i, warmth, redness, tenderness extending proximally, improved from prior  Psychiatric: Alert and oriented x4, good insight and judgment    Labs:   Recent Labs     09/14/20  1137 09/15/20  0513   WBC 14.0* 11.6*   HGB 12.8* 12.6*   HCT 38.9* 38.1*    288     Recent Labs     09/14/20  1137 09/15/20  0513   * 135*   K 4.0 4.2   CL 96* 100   CO2 24 26   BUN 10 9   CREATININE 0.8 0.9   CALCIUM 9.1 8.7     Recent Labs     09/14/20  1137   AST 21   ALT 26   BILITOT 0.7   ALKPHOS 117     Recent Labs     09/14/20  1137   INR 1.20*     No results for input(s): Norvel Josephine in the last 72 hours. Urinalysis:    No results found for: Everette Tiffanie, BACTERIA, RBCUA, BLOODU, SPECGRAV, [place] [address]    Radiology:  XR FOOT RIGHT (MIN 3 VIEWS)   Final Result   Impression:    Status post hallux amputation. XR CHEST PORTABLE   Final Result   1. No acute cardiopulmonary abnormalities. XR FOOT RIGHT (MIN 3 VIEWS)   Final Result      1. Soft tissue ulceration of the great toe ascites emphysema and with some mild irregularity of the cortical contour of the distal phalanx suspicious for acute osteomyelitis.       VL Extremity Venous Right   Final Result          Assessment/Plan:    Active Hospital Problems    Diagnosis Date Noted    Right foot ulcer, with unspecified severity [facility]) [L97.519] 09/14/2020   # Lower extremity ulcer and cellulitis  # T2DM    Plan:    # Lower extremity ulcer and cellulitis  -duplex ruled out DVT  -podiatry consulted  -blood/wound cultures sent  -Vancomycin/zosyn  -NPO at [place]  -OR w/ podiatry 9/14, plan for OR in AM     # T2DM  -carb control diet when able  -Lantus

## 2020-09-16 ENCOUNTER — ANESTHESIA (OUTPATIENT)
Dept: OPERATING ROOM | Age: 65
DRG: 616 | End: 2020-09-16
Payer: MEDICARE

## 2020-09-16 ENCOUNTER — APPOINTMENT (OUTPATIENT)
Dept: GENERAL RADIOLOGY | Age: 65
DRG: 616 | End: 2020-09-16
Payer: MEDICARE

## 2020-09-16 VITALS — DIASTOLIC BLOOD PRESSURE: 71 MMHG | OXYGEN SATURATION: 100 % | SYSTOLIC BLOOD PRESSURE: 109 MMHG

## 2020-09-16 LAB
ANION GAP SERPL CALCULATED.3IONS-SCNC: 10 MMOL/L (ref 3–16)
BUN BLDV-MCNC: 14 MG/DL (ref 7–20)
C-REACTIVE PROTEIN: 176.1 MG/L (ref 0–5.1)
CALCIUM SERPL-MCNC: 8.9 MG/DL (ref 8.3–10.6)
CHLORIDE BLD-SCNC: 100 MMOL/L (ref 99–110)
CO2: 25 MMOL/L (ref 21–32)
CREAT SERPL-MCNC: 1 MG/DL (ref 0.8–1.3)
GFR AFRICAN AMERICAN: >60
GFR NON-AFRICAN AMERICAN: >60
GLUCOSE BLD-MCNC: 148 MG/DL (ref 70–99)
GLUCOSE BLD-MCNC: 167 MG/DL (ref 70–99)
GLUCOSE BLD-MCNC: 171 MG/DL (ref 70–99)
GLUCOSE BLD-MCNC: 221 MG/DL (ref 70–99)
GLUCOSE BLD-MCNC: 234 MG/DL (ref 70–99)
GLUCOSE BLD-MCNC: 280 MG/DL (ref 70–99)
HCT VFR BLD CALC: 38.4 % (ref 40.5–52.5)
HEMOGLOBIN: 12.8 G/DL (ref 13.5–17.5)
INR BLD: 1.2 (ref 0.86–1.14)
MCH RBC QN AUTO: 24.8 PG (ref 26–34)
MCHC RBC AUTO-ENTMCNC: 33.2 G/DL (ref 31–36)
MCV RBC AUTO: 74.5 FL (ref 80–100)
PDW BLD-RTO: 15.6 % (ref 12.4–15.4)
PERFORMED ON: ABNORMAL
PLATELET # BLD: 339 K/UL (ref 135–450)
PMV BLD AUTO: 7.9 FL (ref 5–10.5)
POTASSIUM REFLEX MAGNESIUM: 4.4 MMOL/L (ref 3.5–5.1)
PROTHROMBIN TIME: 14 SEC (ref 10–13.2)
RBC # BLD: 5.15 M/UL (ref 4.2–5.9)
REASON FOR REJECTION: NORMAL
REJECTED TEST: NORMAL
SEDIMENTATION RATE, ERYTHROCYTE: 73 MM/HR (ref 0–20)
SODIUM BLD-SCNC: 135 MMOL/L (ref 136–145)
WBC # BLD: 13.9 K/UL (ref 4–11)

## 2020-09-16 PROCEDURE — 3600000012 HC SURGERY LEVEL 2 ADDTL 15MIN: Performed by: PODIATRIST

## 2020-09-16 PROCEDURE — 6360000002 HC RX W HCPCS: Performed by: NURSE ANESTHETIST, CERTIFIED REGISTERED

## 2020-09-16 PROCEDURE — 86140 C-REACTIVE PROTEIN: CPT

## 2020-09-16 PROCEDURE — 73630 X-RAY EXAM OF FOOT: CPT

## 2020-09-16 PROCEDURE — 85652 RBC SED RATE AUTOMATED: CPT

## 2020-09-16 PROCEDURE — 2709999900 HC NON-CHARGEABLE SUPPLY: Performed by: PODIATRIST

## 2020-09-16 PROCEDURE — 3700000001 HC ADD 15 MINUTES (ANESTHESIA): Performed by: PODIATRIST

## 2020-09-16 PROCEDURE — 85610 PROTHROMBIN TIME: CPT

## 2020-09-16 PROCEDURE — 6370000000 HC RX 637 (ALT 250 FOR IP): Performed by: INTERNAL MEDICINE

## 2020-09-16 PROCEDURE — 80048 BASIC METABOLIC PNL TOTAL CA: CPT

## 2020-09-16 PROCEDURE — 85027 COMPLETE CBC AUTOMATED: CPT

## 2020-09-16 PROCEDURE — 2580000003 HC RX 258: Performed by: PODIATRIST

## 2020-09-16 PROCEDURE — 1200000000 HC SEMI PRIVATE

## 2020-09-16 PROCEDURE — 0JQQ0ZZ REPAIR RIGHT FOOT SUBCUTANEOUS TISSUE AND FASCIA, OPEN APPROACH: ICD-10-PCS | Performed by: PODIATRIST

## 2020-09-16 PROCEDURE — 88311 DECALCIFY TISSUE: CPT

## 2020-09-16 PROCEDURE — 6360000002 HC RX W HCPCS: Performed by: PODIATRIST

## 2020-09-16 PROCEDURE — 6360000002 HC RX W HCPCS: Performed by: ANESTHESIOLOGY

## 2020-09-16 PROCEDURE — 36415 COLL VENOUS BLD VENIPUNCTURE: CPT

## 2020-09-16 PROCEDURE — 3700000000 HC ANESTHESIA ATTENDED CARE: Performed by: PODIATRIST

## 2020-09-16 PROCEDURE — 6370000000 HC RX 637 (ALT 250 FOR IP): Performed by: PODIATRIST

## 2020-09-16 PROCEDURE — 2580000003 HC RX 258: Performed by: NURSE ANESTHETIST, CERTIFIED REGISTERED

## 2020-09-16 PROCEDURE — 94640 AIRWAY INHALATION TREATMENT: CPT

## 2020-09-16 PROCEDURE — 99223 1ST HOSP IP/OBS HIGH 75: CPT | Performed by: INTERNAL MEDICINE

## 2020-09-16 PROCEDURE — 88305 TISSUE EXAM BY PATHOLOGIST: CPT

## 2020-09-16 PROCEDURE — 0QBN0ZZ EXCISION OF RIGHT METATARSAL, OPEN APPROACH: ICD-10-PCS | Performed by: PODIATRIST

## 2020-09-16 PROCEDURE — 3600000002 HC SURGERY LEVEL 2 BASE: Performed by: PODIATRIST

## 2020-09-16 PROCEDURE — 2700000000 HC OXYGEN THERAPY PER DAY

## 2020-09-16 PROCEDURE — 7100000000 HC PACU RECOVERY - FIRST 15 MIN: Performed by: PODIATRIST

## 2020-09-16 PROCEDURE — 94761 N-INVAS EAR/PLS OXIMETRY MLT: CPT

## 2020-09-16 PROCEDURE — 2500000003 HC RX 250 WO HCPCS: Performed by: PODIATRIST

## 2020-09-16 PROCEDURE — 7100000001 HC PACU RECOVERY - ADDTL 15 MIN: Performed by: PODIATRIST

## 2020-09-16 RX ORDER — LIDOCAINE HYDROCHLORIDE 10 MG/ML
INJECTION, SOLUTION EPIDURAL; INFILTRATION; INTRACAUDAL; PERINEURAL PRN
Status: DISCONTINUED | OUTPATIENT
Start: 2020-09-16 | End: 2020-09-16 | Stop reason: ALTCHOICE

## 2020-09-16 RX ORDER — ALBUTEROL SULFATE 2.5 MG/3ML
2.5 SOLUTION RESPIRATORY (INHALATION) EVERY 6 HOURS PRN
Status: DISCONTINUED | OUTPATIENT
Start: 2020-09-16 | End: 2020-09-18 | Stop reason: HOSPADM

## 2020-09-16 RX ORDER — PROMETHAZINE HYDROCHLORIDE 25 MG/ML
6.25 INJECTION, SOLUTION INTRAMUSCULAR; INTRAVENOUS
Status: DISCONTINUED | OUTPATIENT
Start: 2020-09-16 | End: 2020-09-16 | Stop reason: HOSPADM

## 2020-09-16 RX ORDER — OXYCODONE HYDROCHLORIDE AND ACETAMINOPHEN 5; 325 MG/1; MG/1
1 TABLET ORAL
Status: DISCONTINUED | OUTPATIENT
Start: 2020-09-16 | End: 2020-09-16 | Stop reason: HOSPADM

## 2020-09-16 RX ORDER — FENTANYL CITRATE 50 UG/ML
INJECTION, SOLUTION INTRAMUSCULAR; INTRAVENOUS PRN
Status: DISCONTINUED | OUTPATIENT
Start: 2020-09-16 | End: 2020-09-16 | Stop reason: SDUPTHER

## 2020-09-16 RX ORDER — LIDOCAINE HYDROCHLORIDE 20 MG/ML
INJECTION, SOLUTION INTRAVENOUS PRN
Status: DISCONTINUED | OUTPATIENT
Start: 2020-09-16 | End: 2020-09-16 | Stop reason: SDUPTHER

## 2020-09-16 RX ORDER — SODIUM CHLORIDE, SODIUM LACTATE, POTASSIUM CHLORIDE, CALCIUM CHLORIDE 600; 310; 30; 20 MG/100ML; MG/100ML; MG/100ML; MG/100ML
INJECTION, SOLUTION INTRAVENOUS CONTINUOUS PRN
Status: DISCONTINUED | OUTPATIENT
Start: 2020-09-16 | End: 2020-09-16 | Stop reason: SDUPTHER

## 2020-09-16 RX ORDER — FLUCONAZOLE 200 MG/1
100 TABLET ORAL DAILY
Status: DISCONTINUED | OUTPATIENT
Start: 2020-09-16 | End: 2020-09-18 | Stop reason: HOSPADM

## 2020-09-16 RX ORDER — ONDANSETRON 2 MG/ML
INJECTION INTRAMUSCULAR; INTRAVENOUS PRN
Status: DISCONTINUED | OUTPATIENT
Start: 2020-09-16 | End: 2020-09-16 | Stop reason: SDUPTHER

## 2020-09-16 RX ORDER — AMMONIUM LACTATE 12 G/100G
LOTION TOPICAL PRN
Status: DISCONTINUED | OUTPATIENT
Start: 2020-09-16 | End: 2020-09-18 | Stop reason: HOSPADM

## 2020-09-16 RX ORDER — LINEZOLID 600 MG/1
600 TABLET, FILM COATED ORAL EVERY 12 HOURS SCHEDULED
Status: DISCONTINUED | OUTPATIENT
Start: 2020-09-16 | End: 2020-09-18 | Stop reason: HOSPADM

## 2020-09-16 RX ORDER — MIDAZOLAM HYDROCHLORIDE 1 MG/ML
INJECTION INTRAMUSCULAR; INTRAVENOUS PRN
Status: DISCONTINUED | OUTPATIENT
Start: 2020-09-16 | End: 2020-09-16 | Stop reason: SDUPTHER

## 2020-09-16 RX ORDER — PROPOFOL 10 MG/ML
INJECTION, EMULSION INTRAVENOUS PRN
Status: DISCONTINUED | OUTPATIENT
Start: 2020-09-16 | End: 2020-09-16 | Stop reason: SDUPTHER

## 2020-09-16 RX ORDER — FENTANYL CITRATE 50 UG/ML
25 INJECTION, SOLUTION INTRAMUSCULAR; INTRAVENOUS EVERY 5 MIN PRN
Status: DISCONTINUED | OUTPATIENT
Start: 2020-09-16 | End: 2020-09-16 | Stop reason: HOSPADM

## 2020-09-16 RX ORDER — ONDANSETRON 2 MG/ML
4 INJECTION INTRAMUSCULAR; INTRAVENOUS
Status: DISCONTINUED | OUTPATIENT
Start: 2020-09-16 | End: 2020-09-16 | Stop reason: HOSPADM

## 2020-09-16 RX ORDER — FENTANYL CITRATE 50 UG/ML
50 INJECTION, SOLUTION INTRAMUSCULAR; INTRAVENOUS EVERY 5 MIN PRN
Status: DISCONTINUED | OUTPATIENT
Start: 2020-09-16 | End: 2020-09-16 | Stop reason: HOSPADM

## 2020-09-16 RX ORDER — HYDRALAZINE HYDROCHLORIDE 20 MG/ML
5 INJECTION INTRAMUSCULAR; INTRAVENOUS EVERY 10 MIN PRN
Status: DISCONTINUED | OUTPATIENT
Start: 2020-09-16 | End: 2020-09-16 | Stop reason: HOSPADM

## 2020-09-16 RX ORDER — LABETALOL 20 MG/4 ML (5 MG/ML) INTRAVENOUS SYRINGE
5 EVERY 10 MIN PRN
Status: DISCONTINUED | OUTPATIENT
Start: 2020-09-16 | End: 2020-09-16 | Stop reason: HOSPADM

## 2020-09-16 RX ORDER — BUPIVACAINE HYDROCHLORIDE 5 MG/ML
INJECTION, SOLUTION EPIDURAL; INTRACAUDAL PRN
Status: DISCONTINUED | OUTPATIENT
Start: 2020-09-16 | End: 2020-09-16 | Stop reason: ALTCHOICE

## 2020-09-16 RX ADMIN — PHENYLEPHRINE HYDROCHLORIDE 160 MCG: 10 INJECTION, SOLUTION INTRAMUSCULAR; INTRAVENOUS; SUBCUTANEOUS at 13:58

## 2020-09-16 RX ADMIN — PROPOFOL 200 MG: 10 INJECTION, EMULSION INTRAVENOUS at 12:48

## 2020-09-16 RX ADMIN — PHENYLEPHRINE HYDROCHLORIDE 160 MCG: 10 INJECTION, SOLUTION INTRAMUSCULAR; INTRAVENOUS; SUBCUTANEOUS at 14:02

## 2020-09-16 RX ADMIN — LINEZOLID 600 MG: 600 TABLET, FILM COATED ORAL at 22:21

## 2020-09-16 RX ADMIN — PIPERACILLIN AND TAZOBACTAM 3.38 G: 3; .375 INJECTION, POWDER, LYOPHILIZED, FOR SOLUTION INTRAVENOUS at 20:26

## 2020-09-16 RX ADMIN — FAMOTIDINE 20 MG: 20 TABLET, FILM COATED ORAL at 08:57

## 2020-09-16 RX ADMIN — FLUCONAZOLE 100 MG: 200 TABLET ORAL at 20:26

## 2020-09-16 RX ADMIN — PROPOFOL 50 MG: 10 INJECTION, EMULSION INTRAVENOUS at 12:57

## 2020-09-16 RX ADMIN — ONDANSETRON 4 MG: 2 INJECTION INTRAMUSCULAR; INTRAVENOUS at 14:07

## 2020-09-16 RX ADMIN — DOCUSATE SODIUM 50 MG AND SENNOSIDES 8.6 MG 1 TABLET: 8.6; 5 TABLET, FILM COATED ORAL at 20:26

## 2020-09-16 RX ADMIN — OXYCODONE HYDROCHLORIDE AND ACETAMINOPHEN 2 TABLET: 5; 325 TABLET ORAL at 20:36

## 2020-09-16 RX ADMIN — FENTANYL CITRATE 50 MCG: 50 INJECTION INTRAMUSCULAR; INTRAVENOUS at 12:48

## 2020-09-16 RX ADMIN — Medication 10 ML: at 20:29

## 2020-09-16 RX ADMIN — PHENYLEPHRINE HYDROCHLORIDE 160 MCG: 10 INJECTION, SOLUTION INTRAMUSCULAR; INTRAVENOUS; SUBCUTANEOUS at 13:09

## 2020-09-16 RX ADMIN — VANCOMYCIN HYDROCHLORIDE 1750 MG: 10 INJECTION, POWDER, LYOPHILIZED, FOR SOLUTION INTRAVENOUS at 03:14

## 2020-09-16 RX ADMIN — INSULIN LISPRO 1 UNITS: 100 INJECTION, SOLUTION INTRAVENOUS; SUBCUTANEOUS at 11:59

## 2020-09-16 RX ADMIN — PHENYLEPHRINE HYDROCHLORIDE 160 MCG: 10 INJECTION, SOLUTION INTRAMUSCULAR; INTRAVENOUS; SUBCUTANEOUS at 13:48

## 2020-09-16 RX ADMIN — PHENYLEPHRINE HYDROCHLORIDE 160 MCG: 10 INJECTION, SOLUTION INTRAMUSCULAR; INTRAVENOUS; SUBCUTANEOUS at 13:43

## 2020-09-16 RX ADMIN — PHENYLEPHRINE HYDROCHLORIDE 160 MCG: 10 INJECTION, SOLUTION INTRAMUSCULAR; INTRAVENOUS; SUBCUTANEOUS at 13:14

## 2020-09-16 RX ADMIN — FENTANYL CITRATE 50 MCG: 50 INJECTION INTRAMUSCULAR; INTRAVENOUS at 13:28

## 2020-09-16 RX ADMIN — LIDOCAINE HYDROCHLORIDE 100 MG: 20 INJECTION, SOLUTION INTRAVENOUS at 12:48

## 2020-09-16 RX ADMIN — PHENYLEPHRINE HYDROCHLORIDE 160 MCG: 10 INJECTION, SOLUTION INTRAMUSCULAR; INTRAVENOUS; SUBCUTANEOUS at 13:52

## 2020-09-16 RX ADMIN — INSULIN LISPRO 1 UNITS: 100 INJECTION, SOLUTION INTRAVENOUS; SUBCUTANEOUS at 22:13

## 2020-09-16 RX ADMIN — PHENYLEPHRINE HYDROCHLORIDE 160 MCG: 10 INJECTION, SOLUTION INTRAMUSCULAR; INTRAVENOUS; SUBCUTANEOUS at 13:17

## 2020-09-16 RX ADMIN — MIDAZOLAM HYDROCHLORIDE 2 MG: 2 INJECTION, SOLUTION INTRAMUSCULAR; INTRAVENOUS at 12:42

## 2020-09-16 RX ADMIN — FAMOTIDINE 20 MG: 20 TABLET, FILM COATED ORAL at 20:26

## 2020-09-16 RX ADMIN — INSULIN LISPRO 9 UNITS: 100 INJECTION, SOLUTION INTRAVENOUS; SUBCUTANEOUS at 08:59

## 2020-09-16 RX ADMIN — INSULIN LISPRO 9 UNITS: 100 INJECTION, SOLUTION INTRAVENOUS; SUBCUTANEOUS at 18:05

## 2020-09-16 RX ADMIN — INSULIN LISPRO 2 UNITS: 100 INJECTION, SOLUTION INTRAVENOUS; SUBCUTANEOUS at 08:58

## 2020-09-16 RX ADMIN — PIPERACILLIN AND TAZOBACTAM 3.38 G: 3; .375 INJECTION, POWDER, LYOPHILIZED, FOR SOLUTION INTRAVENOUS at 11:58

## 2020-09-16 RX ADMIN — INSULIN LISPRO 1 UNITS: 100 INJECTION, SOLUTION INTRAVENOUS; SUBCUTANEOUS at 18:04

## 2020-09-16 RX ADMIN — PIPERACILLIN AND TAZOBACTAM 3.38 G: 3; .375 INJECTION, POWDER, LYOPHILIZED, FOR SOLUTION INTRAVENOUS at 05:58

## 2020-09-16 RX ADMIN — OXYCODONE HYDROCHLORIDE AND ACETAMINOPHEN 2 TABLET: 5; 325 TABLET ORAL at 08:56

## 2020-09-16 RX ADMIN — VANCOMYCIN HYDROCHLORIDE 1750 MG: 10 INJECTION, POWDER, LYOPHILIZED, FOR SOLUTION INTRAVENOUS at 17:03

## 2020-09-16 RX ADMIN — DOCUSATE SODIUM 50 MG AND SENNOSIDES 8.6 MG 1 TABLET: 8.6; 5 TABLET, FILM COATED ORAL at 08:57

## 2020-09-16 RX ADMIN — PROPOFOL 50 MG: 10 INJECTION, EMULSION INTRAVENOUS at 13:03

## 2020-09-16 RX ADMIN — ALBUTEROL SULFATE 2.5 MG: 2.5 SOLUTION RESPIRATORY (INHALATION) at 14:27

## 2020-09-16 RX ADMIN — SODIUM CHLORIDE, SODIUM LACTATE, POTASSIUM CHLORIDE, AND CALCIUM CHLORIDE: 600; 310; 30; 20 INJECTION, SOLUTION INTRAVENOUS at 12:32

## 2020-09-16 RX ADMIN — PHENYLEPHRINE HYDROCHLORIDE 160 MCG: 10 INJECTION, SOLUTION INTRAMUSCULAR; INTRAVENOUS; SUBCUTANEOUS at 13:20

## 2020-09-16 RX ADMIN — PHENYLEPHRINE HYDROCHLORIDE 160 MCG: 10 INJECTION, SOLUTION INTRAMUSCULAR; INTRAVENOUS; SUBCUTANEOUS at 14:00

## 2020-09-16 ASSESSMENT — PAIN SCALES - GENERAL
PAINLEVEL_OUTOF10: 0
PAINLEVEL_OUTOF10: 9
PAINLEVEL_OUTOF10: 0
PAINLEVEL_OUTOF10: 10
PAINLEVEL_OUTOF10: 10
PAINLEVEL_OUTOF10: 0
PAINLEVEL_OUTOF10: 5

## 2020-09-16 ASSESSMENT — PULMONARY FUNCTION TESTS
PIF_VALUE: 15
PIF_VALUE: 13
PIF_VALUE: 12
PIF_VALUE: 12
PIF_VALUE: 16
PIF_VALUE: 15
PIF_VALUE: 15
PIF_VALUE: 14
PIF_VALUE: 12
PIF_VALUE: 26
PIF_VALUE: 1
PIF_VALUE: 1
PIF_VALUE: 15
PIF_VALUE: 0
PIF_VALUE: 15
PIF_VALUE: 15
PIF_VALUE: 14
PIF_VALUE: 14
PIF_VALUE: 16
PIF_VALUE: 16
PIF_VALUE: 12
PIF_VALUE: 14
PIF_VALUE: 15
PIF_VALUE: 14
PIF_VALUE: 1
PIF_VALUE: 15
PIF_VALUE: 13
PIF_VALUE: 19
PIF_VALUE: 12
PIF_VALUE: 13
PIF_VALUE: 13
PIF_VALUE: 0
PIF_VALUE: 12
PIF_VALUE: 13
PIF_VALUE: 15
PIF_VALUE: 14
PIF_VALUE: 13
PIF_VALUE: 13
PIF_VALUE: 5
PIF_VALUE: 8
PIF_VALUE: 15
PIF_VALUE: 11
PIF_VALUE: 15
PIF_VALUE: 15
PIF_VALUE: 16
PIF_VALUE: 16
PIF_VALUE: 14
PIF_VALUE: 15
PIF_VALUE: 13
PIF_VALUE: 15
PIF_VALUE: 15
PIF_VALUE: 12
PIF_VALUE: 14
PIF_VALUE: 14
PIF_VALUE: 15
PIF_VALUE: 15
PIF_VALUE: 12
PIF_VALUE: 13
PIF_VALUE: 16
PIF_VALUE: 15
PIF_VALUE: 14
PIF_VALUE: 13
PIF_VALUE: 0
PIF_VALUE: 15
PIF_VALUE: 15
PIF_VALUE: 12
PIF_VALUE: 15
PIF_VALUE: 15
PIF_VALUE: 14
PIF_VALUE: 2
PIF_VALUE: 3
PIF_VALUE: 13
PIF_VALUE: 14
PIF_VALUE: 13
PIF_VALUE: 18
PIF_VALUE: 15
PIF_VALUE: 13
PIF_VALUE: 12
PIF_VALUE: 14
PIF_VALUE: 13
PIF_VALUE: 12
PIF_VALUE: 6
PIF_VALUE: 12
PIF_VALUE: 11
PIF_VALUE: 15

## 2020-09-16 ASSESSMENT — PAIN DESCRIPTION - PAIN TYPE: TYPE: ACUTE PAIN

## 2020-09-16 ASSESSMENT — PAIN DESCRIPTION - DESCRIPTORS: DESCRIPTORS: ACHING;SHARP

## 2020-09-16 ASSESSMENT — PAIN DESCRIPTION - PROGRESSION: CLINICAL_PROGRESSION: GRADUALLY IMPROVING

## 2020-09-16 ASSESSMENT — PAIN DESCRIPTION - FREQUENCY: FREQUENCY: INTERMITTENT

## 2020-09-16 ASSESSMENT — PAIN DESCRIPTION - ORIENTATION: ORIENTATION: RIGHT

## 2020-09-16 ASSESSMENT — PAIN - FUNCTIONAL ASSESSMENT: PAIN_FUNCTIONAL_ASSESSMENT: PREVENTS OR INTERFERES SOME ACTIVE ACTIVITIES AND ADLS

## 2020-09-16 ASSESSMENT — PAIN DESCRIPTION - ONSET: ONSET: GRADUAL

## 2020-09-16 ASSESSMENT — PAIN DESCRIPTION - LOCATION: LOCATION: TOE (COMMENT WHICH ONE)

## 2020-09-16 NOTE — PROGRESS NOTES
Pt a/o x4. VSS. Pt sleeping in chair tonight, feet elevated. Has denied pain to foot. RLE dressing is CDI. Voiding well. Pt npo at this time for procedure. Will continue to monitor.

## 2020-09-16 NOTE — PROGRESS NOTES
Pt is alert and oriented. bp slightly elevated this am. C/o of pain, medication given. Pt currently npo d/t surgical procedure this afternoon. RLE Dressing is clean, dry, and intact. Dressing changes being done by podiatry. Pt voiding without issue. No new skin issues not listed in LDA's. Will continue to monitor.

## 2020-09-16 NOTE — PROGRESS NOTES
PACU Transfer Note    Current Allergies: Patient has no known allergies. Pt meets criteria as per Dann Score and ASPAN Standards to transfer to next phase of care. Recent Labs     09/16/20  1102 09/16/20  1423   POCGLU 171* 148*         Vitals:    09/16/20 1445   BP: (!) 108/57   Pulse: 78   Resp: 20   Temp: 98.2 °F (36.8 °C)   SpO2: 99%     BP within   20% of pt's admitting BP as per DANN SCORE    SpO2: 99 %    O2 Flow Rate (L/min): 2 L/min      Intake/Output Summary (Last 24 hours) at 9/16/2020 1452  Last data filed at 9/16/2020 1445  Gross per 24 hour   Intake 675 ml   Output 1810 ml   Net -1135 ml       Pain assessment:  none    Pain Level: 0    No other skin issues noted. Is patient incontinent: no       Handoff report given at bedside.    Family updated and directed to pt room      9/16/2020 2:52 PM

## 2020-09-16 NOTE — ANESTHESIA PRE PROCEDURE
at 09/16/20 0856    [START ON 9/17/2020] enoxaparin (LOVENOX) injection 40 mg  40 mg Subcutaneous Daily Maral Araujo DPM        insulin glargine (LANTUS;BASAGLAR) injection pen 20 Units  20 Units Subcutaneous Nightly Reanna Guzman MD   20 Units at 09/15/20 2338    piperacillin-tazobactam (ZOSYN) 3.375 g in dextrose 5 % 100 mL IVPB extended infusion (mini-bag)  3.375 g Intravenous Q8H Maral Araujo DPM 25 mL/hr at 09/16/20 0558 3.375 g at 09/16/20 0558    sodium chloride flush 0.9 % injection 10 mL  10 mL Intravenous 2 times per day Maral Araujo DPM   10 mL at 09/15/20 2007    sodium chloride flush 0.9 % injection 10 mL  10 mL Intravenous PRN Maral Araujo DPM        potassium chloride 10 mEq/100 mL IVPB (Peripheral Line)  10 mEq Intravenous PRN Maral Araujo DPM        magnesium sulfate 1 g in dextrose 5% 100 mL IVPB  1 g Intravenous PRN Maral Araujo DPM        acetaminophen (TYLENOL) tablet 650 mg  650 mg Oral Q6H PRN CATHERINE BurnetteM   650 mg at 09/15/20 0406    Or    acetaminophen (TYLENOL) suppository 650 mg  650 mg Rectal Q6H PRN Maral Araujo DPM        sennosides-docusate sodium (SENOKOT-S) 8.6-50 MG tablet 1 tablet  1 tablet Oral BID Maral Araujo DPM   1 tablet at 09/16/20 0857    magnesium hydroxide (MILK OF MAGNESIA) 400 MG/5ML suspension 30 mL  30 mL Oral Daily PRN Maral Araujo DPM        promethazine (PHENERGAN) tablet 12.5 mg  12.5 mg Oral Q6H PRN Maral Araujo DPM        Or    ondansetron (ZOFRAN) injection 4 mg  4 mg Intravenous Q6H PRN Maral Araujo DPM        famotidine (PEPCID) tablet 20 mg  20 mg Oral BID Maral Araujo DPM   20 mg at 09/16/20 0857    glucose (GLUTOSE) 40 % oral gel 15 g  15 g Oral PRN Maral Araujo DPM        dextrose 50 % IV solution  12.5 g Intravenous PRN Maral Araujo DPM        glucagon (rDNA) injection 1 mg  1 mg Intramuscular PRN Maral Araujo DPM        dextrose 5 % solution  100 mL/hr Intravenous PRN Maral Araujo DPM        insulin lispro (1 BUN 14 09/16/2020    CREATININE 1.0 09/16/2020    GFRAA >60 09/16/2020    AGRATIO 0.7 09/14/2020    LABGLOM >60 09/16/2020    GLUCOSE 280 09/16/2020    PROT 7.8 09/14/2020    CALCIUM 8.9 09/16/2020    BILITOT 0.7 09/14/2020    ALKPHOS 117 09/14/2020    AST 21 09/14/2020    ALT 26 09/14/2020       POC Tests:   Recent Labs     09/16/20  0736   POCGLU 234*       Coags:   Lab Results   Component Value Date    PROTIME 14.0 09/14/2020    INR 1.20 09/14/2020       HCG (If Applicable): No results found for: PREGTESTUR, PREGSERUM, HCG, HCGQUANT     ABGs: No results found for: PHART, PO2ART, EYD4JDU, PNK8XSB, BEART, Q8VXUZMA     Type & Screen (If Applicable):  No results found for: LABABO, LABRH    Drug/Infectious Status (If Applicable):  No results found for: HIV, HEPCAB    COVID-19 Screening (If Applicable):   Lab Results   Component Value Date    COVID19 Not Detected 09/14/2020         Anesthesia Evaluation  Patient summary reviewed and Nursing notes reviewed no history of anesthetic complications:   Airway: Mallampati: II  TM distance: >3 FB   Neck ROM: full  Mouth opening: > = 3 FB Dental:          Pulmonary:                             ROS comment: Smoker   Cardiovascular:                      Neuro/Psych:   Negative Neuro/Psych ROS              GI/Hepatic/Renal:             Endo/Other:    (+) DiabetesType II DM, poorly controlled, , .                 Abdominal:           Vascular:                                          Anesthesia Plan      general     ASA 3     (  )  Induction: intravenous. Anesthetic plan and risks discussed with patient. Plan discussed with CRNA.     Attending anesthesiologist reviewed and agrees with Pre Eval content        Kim August DO   9/16/2020

## 2020-09-16 NOTE — CARE COORDINATION
Cm following, pt to OR today with Podiatry, pt refused SNF after PT OT demarcus 9/15 knowing they recommend SNF at DC. Pt states after surgery he will improve, will cont to follow for DC needs post-op.   Electronically signed by Maral Le RN on 9/16/2020 at 1:19 PM  [phone]

## 2020-09-16 NOTE — OP NOTE
Operative Note      Patient: Tonja Boas  YOB: 1955  MRN: [id number]     Date of Procedure: 9/16/2020     Pre-Op Diagnosis: Gas gangrene ([facility] [address] [A48.0] Chronic osteomyelitis of right foot ([facility] [address] [S93.044]     Post-Op Diagnosis: Same       Procedure(s):  INCISION AND DRAINAGE WITH FIBULAR SEASMOIDECTOMY AND DELAYED PRIMARY CLOSURE RIGHT FOOT     Surgeon(s):  Shanda Hercules DPM     Assistant:  Resident: Justin Fairchild DPM     Anesthesia: General     Hemostasis: Esmarch ankle tourniquet     Injectables: 20cc of 1% lidocaine plain pre-operatively. 20cc of 0.5% marcaine plain     Materials: 3-0 vicryl, 3-0 nylon     Estimated Blood Loss (mL): Minimal     Complications: None     Specimens:   ID Type Source Tests Collected by Time Destination   A : FIBULAR SESAMOID RIGHT FOOT Tissue Tissue SURGICAL PATHOLOGY Birder Cockayne Rootring, DPM 9/16/2020 1326          Implants:  * No implants in log *      Drains: * No LDAs found *     Findings: First metatarsal head cartilage noted to be hard, and white with degenerative joint disease, otherwise consistent with nonpathologic bone. No purulent drainage noted upon exploration of surrounding compartments of 1st metatarsal.     INDICATIONS FOR PROCEDURE: This patient has signs and symptoms clinically  consistent with the above mentioned preoperative diagnosis. This is the 2nd procedure of a staged surgical intervention. Patient underwent first surgery on 9/14/2020. All potential risks, benefits, and complications were discussed with the patient prior to the scheduling of surgery. All the patient's questions were answered and no guarantees were given. The patient wished to proceed with surgery, and informed written consent was obtained. DETAILS OF PROCEDURE: The patient was brought from the pre-operative area and placed on the operating table in the supine position.  Following IV sedation, a local anesthetic block was then injected proximal to the incision site consisting of 20cc of 1% lidocaine plain. The right lower extremity was then scrubbed, prepped, and draped in the usual sterile fashion. A time-out was performed. The patient, procedure, and operative site were confirmed. An Esmarch bandage was then utilized to exsanguinate the patient's right lower extremity. The Esmarch bandage was then tied off and utilized as an ankle tourniquet and the following procedure was performed. INCISION AND DRAINAGE WITH FIBULAR SEASMOIDECTOMY AND DELAYED PRIMARY CLOSURE RIGHT FOOT: At this time, attention was directed to the patient's right foot to the previous surgical incision site. Using a #15 blade, the remaining necrotic and nonviable soft tissue surrounding the first and second interspace was sharply excised until healthy bleeding edges were noted. Various fascial planes were explored using Metzenbaum scissors with no purulent drainage or signs of infection were noted at this time. Attention was then directed to the plantar aspect of the first metatarsal head, where the fibular sesamoid was identified. Using a rongeur, the fibular sesamoid was excised in total. The fibular sesamoid was then passed from the operating field to the back table to be sent as specimen to pathology. The wound was then irrigated copiously with a mixture of normal saline and gentamicin. Upon completion of this hemostasis was achieved with electrocauterization. The deep fascial layers were then reapproximated using 3-0 Vicryl in a simple interrupted fashion. Upon completion of this, the skin edges were reapproximated using 3-0 nylon in a simple interrupted and vertical mattress fashion. At this time, a local anesthetic was injected about the incision sites consisting of 20 cc of 0.5% Marcaine plain for the patient's postoperative comfort. The Esmarch ankle tourniquet was released and prompt hyperemic response was noted to the remaining at its of the patient's right lower extremity.  A soft sterile dressing was applied consisting of Betadine ointment, Adaptic, gauze, ABD, Colette, Kerlix, and Ace bandage. END OF PROCEDURE: The patient tolerated the procedure and anesthesia well. The patient left the OR and was transferred to the PACU with vital signs stable and vascular status intact to all aspects of the right lower extremity. Following short period of postoperative monitoring, the patient will be readmitted to medical floor for further medical management and treatment of their medical comorbid conditions. DISPO: s/p incision and drainage with fibular sesamoidectomy and delayed primary closure. Surgery felt to be definitive. Awaiting ID recommendations for antibiotics on discharge. Patient to be non weight bearing to right lower extremity.      Dictated on behalf of Bettina Ballesteros DPM    Electronically signed by Emilie Aldrich DPM on 9/16/2020 at 7:18 PM

## 2020-09-16 NOTE — CONSULTS
Subcutaneous TID WC    insulin lispro  0-3 Units Subcutaneous Nightly    vancomycin  1,750 mg Intravenous Q12H       Allergies:  Patient has no known allergies. 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 No weight loss. No visual change, eye pain, eye discharge. No oral lesion, sore throat, dysphagia. Denies cough / sputum. Denies chest pain, palpitations. Denies n / v / abd pain. No diarrhea. Denies dysuria or change in urinary function. Denies joint swelling or pain. No myalgia, arthralgia. Denies skin changes, itching  Denies focal weakness, sensory change or other neurologic symptom    Denies new / worse depression, psychiatric symptoms  Denies any Endocrine or Hematologic symptoms    PHYSICAL EXAM:      Vitals:    /80   Pulse 80   Temp 98.5 °F (36.9 °C) (Oral)   Resp 20   Ht 6' 1\" (1.854 m)   Wt 238 lb 12.1 oz (108.3 kg)   SpO2 99%   BMI 31.50 kg/m²     GENERAL: No apparent distress.   RA  HEENT: Membranes moist, no oral lesion, PERRL  NECK:  Supple, no lymphadenopaty  LUNGS: Clear b/l, no rales, no dullness  CARDIAC: RRR, no murmur appreciated  ABD:  + BS, soft / NT  EXT:  Dry skin alonso on trunk - around axillary, under arms  R foot with dressing / ACE   NEURO: No focal neurologic findings  PSYCH: Orientation, sensorium, mood normal  LINES:  Peripheral iv    DATA:    Lab Results   Component Value Date    WBC 13.9 (H) 09/16/2020    HGB 12.8 (L) 09/16/2020    HCT 38.4 (L) 09/16/2020    MCV 74.5 (L) 09/16/2020     09/16/2020     Lab Results   Component Value Date    CREATININE 1.0 09/16/2020    BUN 14 09/16/2020     (L) 09/16/2020    K 4.4 09/16/2020     09/16/2020    CO2 25 09/16/2020       Hepatic Function Panel:   Lab Results   Component Value Date    ALKPHOS 117 2020    ALT 26 2020    AST 21 2020    PROT 7.8 2020    BILITOT 0.7 2020    LABALBU 3.1 2020 - ESR 98, .8    Micro:   wound cult: light mixed skin sana, heavy P mirabilis, light K oxytoca  Proteus mirabilis (1)   Antibiotic  Interpretation  LUCAS  Status     ampicillin  Sensitive  <=8  mcg/mL      ceFAZolin  Sensitive  <=2  mcg/mL      cefepime  Sensitive  <=2  mcg/mL      cefTRIAXone  Sensitive  <=1  mcg/mL      cefuroxime  Sensitive  <=4  mcg/mL      ciprofloxacin  Sensitive  <=1  mcg/mL      ertapenem  Sensitive  <=0.5  mcg/mL      gentamicin  Sensitive  <=4  mcg/mL      meropenem  Sensitive  <=1  mcg/mL      piperacillin-tazobactam  Sensitive  <=16  mcg/mL      trimethoprim-sulfamethoxazole  Sensitive  <=2/38  mcg/mL        Klebsiella oxytoca (2)   Antibiotic  Interpretation  LUCAS  Status     amoxicillin-clavulanate  Sensitive  <=8/4  mcg/mL      ampicillin  Resistant  >16  mcg/mL      ceFAZolin  Resistant  8  mcg/mL      cefepime  Sensitive  <=2  mcg/mL      cefTRIAXone  Sensitive  <=1  mcg/mL      cefuroxime  Sensitive  <=4  mcg/mL      ciprofloxacin  Sensitive  <=1  mcg/mL      ertapenem  Sensitive  <=0.5  mcg/mL      gentamicin  Sensitive  <=4  mcg/mL      meropenem  Sensitive  <=1  mcg/mL      piperacillin-tazobactam  Sensitive  <=16  mcg/mL      trimethoprim-sulfamethoxazole  Sensitive  <=2/38  mcg/mL        Imagin/14 R LE doppler ultrasound:   No evidence of deep vein or superficial thrombosis involving the right lower    extremity and the contralateral proximal common femoral vein.  R foot x-ray:  Soft tissue ulceration of the great toe ascites emphysema and with some mild irregularity of the cortical contour of the distal phalanx suspicious for acute osteomyelitis.     IMPRESSION:      Patient Active Problem List   Diagnosis    Right foot ulcer, with unspecified severity ([address]       DM  R foot / leg cellulitis  R hallux wound / osteomyelitis    - s/p resection - 'definitive'   - polymicrobial    Skin - dry, question fungal dermatitis (extended from axilla, no tinea cruris)  No antibiotic allergies     RECOMMENDATIONS:    Cont zosyn  D/c vancomycin  Start linezolid  Start fluconazole   Moisturizer     Home on bactrim ds bid x 2 weeks (skin sana, GNR)  Also give fluconazole 100 po daily for same duration  Not candidate for home iv and not necessary (resection definitive)    F/u Podiatry - monitor / close f/u   F/u Derm    Discussed with pt  Rigoberto Rouse MD

## 2020-09-16 NOTE — PROGRESS NOTES
RESPIRATORY THERAPY ASSESSMENT    Name:  Jo Gupta Record Number:  [id number]  Age: 75 y.o. Gender: male  : 1955  Today's Date:  2020  Room:  OR/NONE    Assessment     Is the patient being admitted for a COPD or Asthma exacerbation? No   (If yes the patient will be seen every 4 hours for the first 24 hours and then reassessed)    Patient Admission Diagnosis      Allergies  No Known Allergies            Pulmonary History:No history  Home Oxygen Therapy:  room air  Home Respiratory Therapy:None   Current Respiratory Therapy:  Q6 PRN Albuterol  Treatment Type: HHN  Medications: Albuterol    Respiratory Severity Index(RSI)   Patients with orders for inhalation medications, oxygen, or any therapeutic treatment modality will be placed on Respiratory Protocol. They will be assessed with the first treatment and at least every 72 hours thereafter. The following severity scale will be used to determine frequency of treatment intervention.     Smoking History: Pulmonary Disease or Smoking History, Greater than 15 pack year = 2    Social History  Social History     Tobacco Use    Smoking status: Current Some Day Smoker    Smokeless tobacco: Never Used   Substance Use Topics    Alcohol use: Not Currently    Drug use: Not Currently       Recent Surgical History: Surgery of Extremities = 1  Past Surgical History  Past Surgical History:   Procedure Laterality Date    TOE AMPUTATION Right 2020    AMPUTATION RIGHT HALLUX performed by Dakota Siegel DPM at [address]       Level of Consciousness: Alert, Follows Commands but Disoriented = 1    Level of Activity: Walking with assistance = 1    Respiratory Pattern: Regular Pattern; RR 8-20 = 0    Breath Sounds: Clear = 0    Sputum   ,  ,    Cough: Strong, spontaneous, non-productive = 0    Vital Signs   BP (!) 108/57   Pulse 78   Temp 98.2 °F (36.8 °C) (Temporal)   Resp 20   Ht 6' 1\" (1.854 m)   Wt 238 lb 12.1 oz (108.3 kg)   SpO2 99% BMI 31.50 kg/m²   SPO2 (COPD values may differ): 90-91% on room air or greater than 92% on FiO2 24- 28% = 1    Peak Flow (asthma only): not applicable = 0    RSI: 5-6 = Q4hr PRN (every four hours as needed) for dyspnea        Plan       Goals: medication delivery    Patient/caregiver was educated on the proper method of use for Respiratory Care Devices:  Yes      Level of patient/caregiver understanding able to:   ? Verbalize understanding   ? Demonstrate understanding       ? Teach back        ? Needs reinforcement       ? No available caregiver               ? Other:     Response to education:  Good     Is patient being placed on Home Treatment Regimen? No     Does the patient have everything they need prior to discharge? NA     Comments: Tx given in PACU for post extubation wheezing. No wheezing present with assessment    Plan of Care: Q4 PRN Albuterol    Electronically signed by Ronen Gupta RCP on 9/16/2020 at 2:55 PM    Respiratory Protocol Guidelines     1. Assessment and treatment by Respiratory Therapy will be initiated for medication and therapeutic interventions upon initiation of aerosolized medication. 2. Physician will be contacted for respiratory rate (RR) greater than 35 breaths per minute. Therapy will be held for heart rate (HR) greater than 140 beats per minute, pending direction from physician. 3. Bronchodilators will be administered via Metered Dose Inhaler (MDI) with spacer when the following criteria are met:  a. Alert and cooperative     b. HR < 140 bpm  c. RR < 30 bpm                d. Can demonstrate a 2-3 second inspiratory hold  4. Bronchodilators will be administered via Hand Held Nebulizer TON [facility] to patients when ANY of the following criteria are met  a. Incognizant or uncooperative          b. Patients treated with HHN at Home        c. Unable to demonstrate proper use of MDI with spacer     d. RR > 30 bpm   5.  Bronchodilators will be delivered via Metered Dose Inhaler (MDI), HHN, Aerogen to intubated patients on mechanical ventilation. 6. Inhalation medication orders will be delivered and/or substituted as outlined below. Aerosolized Medications Ordering and Administration Guidelines:    1. All Medications will be ordered by a physician, and their frequency and/or modality will be adjusted as defined by the patients Respiratory Severity Index (RSI) score. 2. If the patient does not have documented COPD, consider discontinuing anticholinergics when RSI is less than 9.  3. If the bronchospasm worsens (increased RSI), then the bronchodilator frequency can be increased to a maximum of every 4 hours. If greater than every 4 hours is required, the physician will be contacted. 4. If the bronchospasm improves, the frequency of the bronchodilator can be decreased, based on the patient's RSI, but not less than home treatment regimen frequency. 5. Bronchodilator(s) will be discontinued if patient has a RSI less than 9 and has received no scheduled or as needed treatment for 72  Hrs. Patients Ordered on a Mucolytic Agent:    1. Must always be administered with a bronchodilator. 2. Discontinue if patient experiences worsened bronchospasm, or secretions have lessened to the point that the patient is able to clear them with a cough. Anti-inflammatory and Combination Medications:    1. If the patient lacks prior history of lung disease, is not using inhaled anti-inflammatory medication at home, and lacks wheezing by examination or by history for at least 24 hours, contact physician for possible discontinuation.

## 2020-09-16 NOTE — PLAN OF CARE
Problem: Falls - Risk of:  Goal: Will remain free from falls  Description: Will remain free from falls  Outcome: Ongoing  Note: Pt a/o x4. Pt remains free from falls. Chair locked in place, 2/4 rails up, chair alarm on, call light/table within reach, non-skid socks on. Will continue to monitor. Problem: Respiratory:  Goal: Ability to maintain adequate ventilation will improve  Outcome: Ongoing  Note: Pt sats well on room air around 95-96%. Problem: Skin Integrity:  Goal: Skin integrity will be maintained  Outcome: Ongoing  Note: Pt skin intact with exception to surgical foot site. Pt able to reposition self and is continent.

## 2020-09-16 NOTE — PROGRESS NOTES
extremity edema  Abdomen: Soft, non-tender, non-distended, no hepatosplenomegaly  Musculoskeletal: No cyanosis, clubbing or petechiae, no lower extremity misalignment, asymmetry, or crepitation  Skin: Normal skin color, texture, turgor. R foot wrapped, dressing c/d/i, warmth, redness, tenderness extending proximally, improved from prior  Psychiatric: Alert and oriented x4, good insight and judgment    Labs:   Recent Labs     09/14/20  1137 09/15/20  0513 09/16/20  0518   WBC 14.0* 11.6* 13.9*   HGB 12.8* 12.6* 12.8*   HCT 38.9* 38.1* 38.4*    288 339     Recent Labs     09/14/20  1137 09/15/20  0513 09/16/20  0518   * 135* 135*   K 4.0 4.2 4.4   CL 96* 100 100   CO2 24 26 25   BUN 10 9 14   CREATININE 0.8 0.9 1.0   CALCIUM 9.1 8.7 8.9     Recent Labs     09/14/20  1137   AST 21   ALT 26   BILITOT 0.7   ALKPHOS 117     Recent Labs     09/14/20  1137   INR 1.20*     No results for input(s): Akash Briscoe in the last 72 hours. Urinalysis:    No results found for: Anabella Lim, BACTERIA, RBCUA, BLOODU, SPECGRAV, [address]    Radiology:  XR FOOT RIGHT (MIN 3 VIEWS)   Final Result   Impression:    Status post hallux amputation. XR CHEST PORTABLE   Final Result   1. No acute cardiopulmonary abnormalities. XR FOOT RIGHT (MIN 3 VIEWS)   Final Result      1. Soft tissue ulceration of the great toe ascites emphysema and with some mild irregularity of the cortical contour of the distal phalanx suspicious for acute osteomyelitis.       VL Extremity Venous Right   Final Result          Assessment/Plan:    Active Hospital Problems    Diagnosis Date Noted    Right foot ulcer, with unspecified severity [facility]) [L97.519] 09/14/2020       Plan:    # Lower extremity ulcer and cellulitis  -duplex ruled out DVT  -podiatry consulted  -blood/wound cultures sent  -Vancomycin/zosyn  -NPO at [place]  -OR w/ podiatry 9/14, plan for OR again 9/16     # T2DM  -carb control diet when able  -Lantus initially 10, increased

## 2020-09-16 NOTE — BRIEF OP NOTE
Brief Postoperative Note      Patient: Angie Guerra  YOB: 1955  MRN: [id number]    Date of Procedure: 9/16/2020    Pre-Op Diagnosis: Gas [address]rene [address] [address] [A48.0] Chronic osteomyelitis of right foot ([facility] [address] [L02.028]    Post-Op Diagnosis: Same       Procedure(s):  INCISION AND DRAINAGE WITH FIBULAR SEASMOIDECTOMY AND DELAYED PRIMARY CLOSURE RIGHT FOOT    Surgeon(s):  Duong Epps DPM    Assistant:  Resident: Andie Benavidse DPM    Anesthesia: General    Hemostasis: Esmarch ankle tourniquet    Injectables: 20cc of 1% lidocaine plain pre-operatively. 20cc of 0.5% marcaine plain    Materials: 3-0 vicryl, 3-0 nylon    Estimated Blood Loss (mL): Minimal    Complications: None    Specimens:   ID Type Source Tests Collected by Time Destination   A : FIBULAR SESAMOID RIGHT FOOT Tissue Tissue SURGICAL PATHOLOGY Malaika Condon DPM 9/16/2020 1326        Implants:  * No implants in log *      Drains: * No LDAs found *    Findings: First metatarsal head cartilage noted to be hard, and white with degenerative joint disease, otherwise consistent with nonpathologic bone. No purulent drainage noted upon exploration of surrounding compartments of 1st metatarsal.     DISPO: s/p incision and drainage with fibular sesamoidectomy and delayed primary closure. Surgery felt to be definitive. Awaiting ID recommendations for antibiotics on discharge. Patient to be non weight bearing to right lower extremity.      Electronically signed by Andie Benavides DPM on 9/16/2020 at 2:18 PM

## 2020-09-16 NOTE — PROGRESS NOTES
Hospitalist Progress Note      PCP: Aaron Hightower    Date of Admission: 9/14/2020    Chief Complaint: Right leg swelling    Subjective:  Patient seen and examined at the bedside. Reports not having a bowel movement for 2 days. OR today with podiatry for I +D, possible first ray resection and delayed primary closure. PFHS: reviewed as documented 9/14/2020, no changes    Medications:  Reviewed    Infusion Medications    dextrose       Scheduled Medications    insulin glargine  25 Units Subcutaneous Nightly    [START ON 9/17/2020] enoxaparin  40 mg Subcutaneous Daily    piperacillin-tazobactam  3.375 g Intravenous Q8H    sodium chloride flush  10 mL Intravenous 2 times per day    sennosides-docusate sodium  1 tablet Oral BID    famotidine  20 mg Oral BID    insulin lispro  0.08 Units/kg Subcutaneous TID WC    insulin lispro  0-6 Units Subcutaneous TID WC    insulin lispro  0-3 Units Subcutaneous Nightly    vancomycin  1,750 mg Intravenous Q12H     PRN Meds: oxyCODONE-acetaminophen **OR** oxyCODONE-acetaminophen, sodium chloride flush, potassium chloride, magnesium sulfate, acetaminophen **OR** acetaminophen, magnesium hydroxide, promethazine **OR** ondansetron, glucose, dextrose, glucagon (rDNA), dextrose      Intake/Output Summary (Last 24 hours) at 9/16/2020 1030  Last data filed at 9/16/2020 0955  Gross per 24 hour   Intake 240 ml   Output 2150 ml   Net -1910 ml         Physical Exam Performed:    BP (!) 143/83   Pulse 88   Temp 98.7 °F (37.1 °C) (Oral)   Resp 16   Ht 6' 1\" (1.854 m)   Wt 238 lb 12.1 oz (108.3 kg)   SpO2 100%   BMI 31.50 kg/m²     General appearance:  No apparent distress, appears stated age  Eyes: Pupils equal, round, reactive to light, conjunctiva/corneas clear  Ears/Nose/Mouth/Throat: No external lesions or scars, hearing intact to voice  Neck: Trachea midline, no masses noted  Respiratory:  Normal respiratory effort.  Clear to auscultation bilaterally  Cardiovascular: Regular rate and rhythm, nl S1/S2, w/o murmurs, rubs or gallops, no left lower extremity edema, laurence LLE  Abdomen: Soft, non-tender, non-distended, high pitched bowel sounds Musculoskeletal: No cyanosis, clubbing or petechiae, no lower extremity misalignment, asymmetry, or crepitation  Skin: Normal skin color, texture, turgor. Texie Lat wrapped up to knee, dressing clean, dry and intact. Psychiatric: Alert and oriented x4, good insight and judgment    Labs:   Recent Labs     09/14/20  1137 09/15/20  0513 09/16/20  0518   WBC 14.0* 11.6* 13.9*   HGB 12.8* 12.6* 12.8*   HCT 38.9* 38.1* 38.4*    288 339     Recent Labs     09/14/20  1137 09/15/20  0513 09/16/20  0518   * 135* 135*   K 4.0 4.2 4.4   CL 96* 100 100   CO2 24 26 25   BUN 10 9 14   CREATININE 0.8 0.9 1.0   CALCIUM 9.1 8.7 8.9     Recent Labs     09/14/20  1137   AST 21   ALT 26   BILITOT 0.7   ALKPHOS 117     Recent Labs     09/14/20  1137   INR 1.20*     No results for input(s): CKTOTAL, TROPONINI in the last 72 hours. Urinalysis:    No results found for: Derril Distel, BACTERIA, RBCUA, BLOODU, SPECGRAV, [address]    Radiology:  XR FOOT RIGHT (MIN 3 VIEWS)   Final Result   Impression:    Status post hallux amputation. XR CHEST PORTABLE   Final Result   1. No acute cardiopulmonary abnormalities. XR FOOT RIGHT (MIN 3 VIEWS)   Final Result      1. Soft tissue ulceration of the great toe ascites emphysema and with some mild irregularity of the cortical contour of the distal phalanx suspicious for acute osteomyelitis.       VL Extremity Venous Right   Final Result          Assessment/Plan:    Active Hospital Problems    Diagnosis Date Noted    Right foot ulcer, with unspecified severity [facility]) [L97.519] 09/14/2020       Plan:    # Lower extremity ulcer and cellulitis  -Duplex ruled out DVT  -Podiatry following   -Wound culture growing proteus mirabilis and klebsiella oxytoca so far  -Continue zosyn/vancomycin. Adjust per sensitivity results. -OR w/ podiatry today    #T2DM  -Lantus initially 10, increase to 25 today  -Start carb control diet  -Dietitian following    #Constipation  -Continue scheduled senokot    DVT Prophylaxis: Lovenox, held for OR today  Diet: Diet NPO, After Midnight  Code Status: Full Code    PT/OT Eval Status: Completed. Recommend inpatient therapy    Dispo - Inpatient with possible placement afterwards.     Gonzalo Jenkins

## 2020-09-16 NOTE — PROGRESS NOTES
Clinical Pharmacy Progress Note    Admit date: 9/14/2020     Subjective/Objective:  Pt is a 66yom with PMHx that includes DM who is admitted with Rt LE gas gangrene and OM. Pt underwent Rt hallux amputation shortly after admission on 9/14. Interval update:  Planning for further I&D with [facility] today. Wound culture growing Klebsiella oxytoca and Proteus mirabilis so far. Pharmacy is consulted to dose Vancomycin per Dr. Ayan Huizar    Current antibiotics:  Zosyn 3.375g IV EI q8h - day #3  Vancomycin - Pharmacy to dose - day #3   1.75g IV q12h (9/14 - current)      Recent Labs     09/15/20  0513 09/16/20  0518   * 135*   K 4.2 4.4    100   CO2 26 25   BUN 9 14   CREATININE 0.9 1.0   GLUCOSE 241* 280*       Estimated Creatinine Clearance: 94 mL/min (based on SCr of 1 mg/dL). Lab Results   Component Value Date    WBC 13.9 (H) 09/16/2020    HGB 12.8 (L) 09/16/2020    HCT 38.4 (L) 09/16/2020    MCV 74.5 (L) 09/16/2020     09/16/2020       Lab Results   Component Value Date    PROTIME 14.0 (H) 09/14/2020    INR 1.20 (H) 09/14/2020       Height:  6' 1\" (185.4 cm)  Weight:  238 lb 12.1 oz (108.3 kg)    Vancomycin Levels:  Trough  9/16 @ 04:00 = ordered, but not drawn  Trough  9/17 @ 04:00 = pending     Culture results:  Wound (9/14) = Heavy growth Proteus mirabilis, sensitivities pending       Light growth Klebsiella oxytoca, sensitivities pending    Prophylaxis:  VTE:  Enoxaparin  GI:  Famotidine    Vaccination screening:  Pneumonia:  Up to date  Influenza:  Not yet available at [facility]    Assessment/Plan:  1)  RLE gas gangrene and OM, s/p Rt hallux amputation:  Zosyn + Vancomycin - day #3  · Vancomycin - Pharmacy to dose  · Continue 1.75g IV q12h. Renal function appears stable. · Trough was ordered to be drawn prior to dose due this AM, but was not drawn before dose was hung. Will re-order trough to be done with dose due 9/17 04:00. Desired trough ~15.    · Clinical condition will be monitored closely,

## 2020-09-16 NOTE — PLAN OF CARE
Problem: Falls - Risk of:  Goal: Will remain free from falls  Description: Will remain free from falls  9/16/2020 1007 by Nilo Clemens RN  Outcome: Ongoing  Note: Pt is A&Ox4, is high fall risk, Pt educated and encouraged to use call light to notify and wait for assistance from staff before getting OOB or chair. pt uses call light appropriately, has made no unsafe movements, no attempts to get OOB or chair without assistance. Pt's bed alarm remained on throughout shift as well as chair alarm. , bed in lowest position, 2/4 side rails up, call light and bedside table within reach. No falls so far this shift, will continue to monitor. 9/16/2020 1005 by Nilo Clemens RN  Outcome: Ongoing  Note: Pt bed in low position and alarm on. Chair alarm on. Non skid socks on. Belongings, bedside table, and call light within reach. 2/4 side rails up. Will continue to monitor. 9/16/2020 0411 by Sabrina Parr RN  Outcome: Ongoing  Note: Pt a/o x4. Pt remains free from falls. Chair locked in place, 2/4 rails up, chair alarm on, call light/table within reach, non-skid socks on. Will continue to monitor. Problem: Infection - Surgical Site:  Goal: Will show no infection signs and symptoms  Description: Will show no infection signs and symptoms  Outcome: Ongoing  Note: Will continue monitor pt vs, labs, surgical sites, and other indictors of infection.

## 2020-09-16 NOTE — PROGRESS NOTES
Patient admitted to PACU # 1 from OR at 1415 post [address] AND DELAYED PRIMARY CLOSURE RIGHT FOOT - Right per Dr. Rafa Lovett. Attached to PACU monitoring system and report received from anesthesia provider. Patient was reported to be hemodynamically stable during procedure, but during extubation there was wheezing from pt, RT called for stat breathing treatment. Patient unresponsive on arrival, not showing any s/sx of pain currently.

## 2020-09-17 LAB
ANION GAP SERPL CALCULATED.3IONS-SCNC: 11 MMOL/L (ref 3–16)
BUN BLDV-MCNC: 10 MG/DL (ref 7–20)
CALCIUM SERPL-MCNC: 8.8 MG/DL (ref 8.3–10.6)
CHLORIDE BLD-SCNC: 101 MMOL/L (ref 99–110)
CO2: 24 MMOL/L (ref 21–32)
CREAT SERPL-MCNC: 1 MG/DL (ref 0.8–1.3)
GFR AFRICAN AMERICAN: >60
GFR NON-AFRICAN AMERICAN: >60
GLUCOSE BLD-MCNC: 177 MG/DL (ref 70–99)
GLUCOSE BLD-MCNC: 185 MG/DL (ref 70–99)
GLUCOSE BLD-MCNC: 193 MG/DL (ref 70–99)
GLUCOSE BLD-MCNC: 220 MG/DL (ref 70–99)
GLUCOSE BLD-MCNC: 226 MG/DL (ref 70–99)
HCT VFR BLD CALC: 39.2 % (ref 40.5–52.5)
HEMOGLOBIN: 12.9 G/DL (ref 13.5–17.5)
INR BLD: 1.22 (ref 0.86–1.14)
MCH RBC QN AUTO: 24.9 PG (ref 26–34)
MCHC RBC AUTO-ENTMCNC: 32.9 G/DL (ref 31–36)
MCV RBC AUTO: 75.7 FL (ref 80–100)
PDW BLD-RTO: 15.6 % (ref 12.4–15.4)
PERFORMED ON: ABNORMAL
PLATELET # BLD: 358 K/UL (ref 135–450)
PMV BLD AUTO: 7.6 FL (ref 5–10.5)
POTASSIUM REFLEX MAGNESIUM: 3.9 MMOL/L (ref 3.5–5.1)
PROTHROMBIN TIME: 14.2 SEC (ref 10–13.2)
RBC # BLD: 5.18 M/UL (ref 4.2–5.9)
SODIUM BLD-SCNC: 136 MMOL/L (ref 136–145)
VANCOMYCIN TROUGH: 10.8 UG/ML (ref 10–20)
WBC # BLD: 12.2 K/UL (ref 4–11)

## 2020-09-17 PROCEDURE — 97530 THERAPEUTIC ACTIVITIES: CPT

## 2020-09-17 PROCEDURE — 6370000000 HC RX 637 (ALT 250 FOR IP): Performed by: INTERNAL MEDICINE

## 2020-09-17 PROCEDURE — 6370000000 HC RX 637 (ALT 250 FOR IP): Performed by: PODIATRIST

## 2020-09-17 PROCEDURE — 2580000003 HC RX 258: Performed by: PODIATRIST

## 2020-09-17 PROCEDURE — 1200000000 HC SEMI PRIVATE

## 2020-09-17 PROCEDURE — 6360000002 HC RX W HCPCS: Performed by: PODIATRIST

## 2020-09-17 PROCEDURE — 80048 BASIC METABOLIC PNL TOTAL CA: CPT

## 2020-09-17 PROCEDURE — 85027 COMPLETE CBC AUTOMATED: CPT

## 2020-09-17 PROCEDURE — 80202 ASSAY OF VANCOMYCIN: CPT

## 2020-09-17 PROCEDURE — 99232 SBSQ HOSP IP/OBS MODERATE 35: CPT | Performed by: INTERNAL MEDICINE

## 2020-09-17 PROCEDURE — 97168 OT RE-EVAL EST PLAN CARE: CPT

## 2020-09-17 PROCEDURE — 36415 COLL VENOUS BLD VENIPUNCTURE: CPT

## 2020-09-17 PROCEDURE — 97535 SELF CARE MNGMENT TRAINING: CPT

## 2020-09-17 PROCEDURE — 85610 PROTHROMBIN TIME: CPT

## 2020-09-17 RX ORDER — HYDROCODONE BITARTRATE AND ACETAMINOPHEN 5; 325 MG/1; MG/1
1 TABLET ORAL EVERY 6 HOURS PRN
Qty: 20 TABLET | Refills: 0 | Status: SHIPPED | OUTPATIENT
Start: 2020-09-17 | End: 2020-09-22

## 2020-09-17 RX ORDER — SULFAMETHOXAZOLE AND TRIMETHOPRIM 800; 160 MG/1; MG/1
1 TABLET ORAL 2 TIMES DAILY
Qty: 28 TABLET | Refills: 0 | Status: SHIPPED | OUTPATIENT
Start: 2020-09-17 | End: 2020-10-01

## 2020-09-17 RX ORDER — FLUCONAZOLE 100 MG/1
100 TABLET ORAL DAILY
Qty: 14 TABLET | Refills: 0 | Status: SHIPPED | OUTPATIENT
Start: 2020-09-18 | End: 2020-10-02

## 2020-09-17 RX ORDER — OXYCODONE HYDROCHLORIDE AND ACETAMINOPHEN 5; 325 MG/1; MG/1
1 TABLET ORAL EVERY 6 HOURS PRN
Status: DISCONTINUED | OUTPATIENT
Start: 2020-09-17 | End: 2020-09-18 | Stop reason: HOSPADM

## 2020-09-17 RX ADMIN — DOCUSATE SODIUM 50 MG AND SENNOSIDES 8.6 MG 1 TABLET: 8.6; 5 TABLET, FILM COATED ORAL at 09:33

## 2020-09-17 RX ADMIN — OXYCODONE HYDROCHLORIDE AND ACETAMINOPHEN 1 TABLET: 5; 325 TABLET ORAL at 13:37

## 2020-09-17 RX ADMIN — PIPERACILLIN AND TAZOBACTAM 3.38 G: 3; .375 INJECTION, POWDER, LYOPHILIZED, FOR SOLUTION INTRAVENOUS at 22:01

## 2020-09-17 RX ADMIN — ACETAMINOPHEN 650 MG: 325 TABLET ORAL at 10:38

## 2020-09-17 RX ADMIN — INSULIN LISPRO 1 UNITS: 100 INJECTION, SOLUTION INTRAVENOUS; SUBCUTANEOUS at 22:00

## 2020-09-17 RX ADMIN — INSULIN LISPRO 9 UNITS: 100 INJECTION, SOLUTION INTRAVENOUS; SUBCUTANEOUS at 12:34

## 2020-09-17 RX ADMIN — FAMOTIDINE 20 MG: 20 TABLET, FILM COATED ORAL at 09:34

## 2020-09-17 RX ADMIN — Medication 10 ML: at 21:55

## 2020-09-17 RX ADMIN — INSULIN LISPRO 9 UNITS: 100 INJECTION, SOLUTION INTRAVENOUS; SUBCUTANEOUS at 18:47

## 2020-09-17 RX ADMIN — FAMOTIDINE 20 MG: 20 TABLET, FILM COATED ORAL at 21:55

## 2020-09-17 RX ADMIN — ENOXAPARIN SODIUM 40 MG: 40 INJECTION SUBCUTANEOUS at 09:32

## 2020-09-17 RX ADMIN — OXYCODONE HYDROCHLORIDE AND ACETAMINOPHEN 1 TABLET: 5; 325 TABLET ORAL at 21:55

## 2020-09-17 RX ADMIN — LINEZOLID 600 MG: 600 TABLET, FILM COATED ORAL at 21:55

## 2020-09-17 RX ADMIN — Medication 10 ML: at 09:35

## 2020-09-17 RX ADMIN — INSULIN LISPRO 1 UNITS: 100 INJECTION, SOLUTION INTRAVENOUS; SUBCUTANEOUS at 12:33

## 2020-09-17 RX ADMIN — PIPERACILLIN AND TAZOBACTAM 3.38 G: 3; .375 INJECTION, POWDER, LYOPHILIZED, FOR SOLUTION INTRAVENOUS at 03:30

## 2020-09-17 RX ADMIN — INSULIN LISPRO 9 UNITS: 100 INJECTION, SOLUTION INTRAVENOUS; SUBCUTANEOUS at 09:49

## 2020-09-17 RX ADMIN — FLUCONAZOLE 100 MG: 200 TABLET ORAL at 09:33

## 2020-09-17 RX ADMIN — PIPERACILLIN AND TAZOBACTAM 3.38 G: 3; .375 INJECTION, POWDER, LYOPHILIZED, FOR SOLUTION INTRAVENOUS at 12:34

## 2020-09-17 RX ADMIN — LINEZOLID 600 MG: 600 TABLET, FILM COATED ORAL at 09:33

## 2020-09-17 RX ADMIN — INSULIN LISPRO 2 UNITS: 100 INJECTION, SOLUTION INTRAVENOUS; SUBCUTANEOUS at 09:49

## 2020-09-17 RX ADMIN — INSULIN LISPRO 1 UNITS: 100 INJECTION, SOLUTION INTRAVENOUS; SUBCUTANEOUS at 18:47

## 2020-09-17 RX ADMIN — MAGNESIUM HYDROXIDE 30 ML: 2400 SUSPENSION ORAL at 11:12

## 2020-09-17 ASSESSMENT — PAIN - FUNCTIONAL ASSESSMENT
PAIN_FUNCTIONAL_ASSESSMENT: ACTIVITIES ARE NOT PREVENTED
PAIN_FUNCTIONAL_ASSESSMENT: PREVENTS OR INTERFERES SOME ACTIVE ACTIVITIES AND ADLS
PAIN_FUNCTIONAL_ASSESSMENT: PREVENTS OR INTERFERES SOME ACTIVE ACTIVITIES AND ADLS

## 2020-09-17 ASSESSMENT — PAIN DESCRIPTION - PAIN TYPE
TYPE: ACUTE PAIN

## 2020-09-17 ASSESSMENT — PAIN SCALES - GENERAL
PAINLEVEL_OUTOF10: 0
PAINLEVEL_OUTOF10: 10
PAINLEVEL_OUTOF10: 0
PAINLEVEL_OUTOF10: 5
PAINLEVEL_OUTOF10: 10
PAINLEVEL_OUTOF10: 0

## 2020-09-17 ASSESSMENT — PAIN DESCRIPTION - ONSET
ONSET: ON-GOING

## 2020-09-17 ASSESSMENT — PAIN DESCRIPTION - DESCRIPTORS
DESCRIPTORS: ACHING

## 2020-09-17 ASSESSMENT — PAIN DESCRIPTION - LOCATION
LOCATION: TOE (COMMENT WHICH ONE)

## 2020-09-17 ASSESSMENT — PAIN DESCRIPTION - PROGRESSION
CLINICAL_PROGRESSION: RAPIDLY WORSENING
CLINICAL_PROGRESSION: GRADUALLY WORSENING
CLINICAL_PROGRESSION: GRADUALLY IMPROVING

## 2020-09-17 ASSESSMENT — PAIN DESCRIPTION - FREQUENCY
FREQUENCY: INTERMITTENT
FREQUENCY: INTERMITTENT
FREQUENCY: CONTINUOUS

## 2020-09-17 ASSESSMENT — PAIN DESCRIPTION - ORIENTATION
ORIENTATION: RIGHT

## 2020-09-17 NOTE — PROGRESS NOTES
Pt is A&O x4. VSS. Pain being controlled with PRN Percocet. Right foot dressing is clean, dry, intact and elevated on pillows. Patient tolerating diet without nausea or vomiting. Pt voiding adequately with no issues. IV in right forearm infusing intermittent antibiotics. All needs met at this time. Will continue to monitor.

## 2020-09-17 NOTE — PROGRESS NOTES
Occupational Therapy   Occupational Therapy Re-evaluation and treatment Note  Date: 2020   Patient Name: Javid Villalobos  MRN: [id number]     : 1955    Date of Service: 2020    Discharge Recommendations:  Javid Villalobos scored a 16/24 on the AM-PAC ADL Inpatient form. Current research shows that an AM-PAC score of 17 or less is typically not associated with a discharge to the patient's home setting. Based on the patient's AM-PAC score and their current ADL deficits, it is recommended that the patient have 3-5 sessions per week of Occupational Therapy at d/c to increase the patient's independence. Please see assessment section for further patient specific details. If patient discharges prior to next session this note will serve as a discharge summary. Please see below for the latest assessment towards goals. OT Equipment Recommendations  Equipment Needed: if Home recommend w/c with elevating leg rest / BSC for toileting - Pt unable to ambulate with NWB RLE / room confined. Assessment   Performance deficits / Impairments: Decreased functional mobility ; Decreased endurance;Decreased ADL status; Decreased safe awareness;Decreased cognition;Decreased strength  Assessment: This is a re-eval, after Pt had a surgery for debridement of R foot/ wound closure. Pt from home with sister. PLOF independent in ADLs and functional mobility. Pt demo significant decline from baseline for simple ADLs and functional mobility. Pt req Max Ax1, with RW for functional transfer to chair with req max verbal cueing for adherence to weight bearing restrictions and for safety. Despite verbal cues and multiple practice transfers, Pt demo difficulties with carryover of RLE non-weight bearing restrictions. Pt is limited 2/2 decrease in safety awareness, need for assistance, and decrease cognition. Pt would benefit from continued inpt OT services to maximize functional independence level.  Continue with plan of care. If home recommend 24hr assist / w/c with elevating leg rest and BSC. Treatment Diagnosis: Decreased functional mobility/ ADL status 2/2 decrease safety awareness  Prognosis: Fair;Good  Decision Making: Medium Complexity  OT Education: Plan of Care;OT Role;Transfer Training;Precautions  Patient Education: verbalize fair understanding, needs reinforcement  REQUIRES OT FOLLOW UP: Yes  Activity Tolerance  Activity Tolerance: Patient limited by fatigue  Activity Tolerance: Pt slight limited 2/2 decrease safety awareness  Safety Devices  Safety Devices in place: Yes  Type of devices: All fall risk precautions in place; Left in chair;Call light within reach;Nurse notified; Chair alarm in place         Patient Diagnosis(es): The primary encounter diagnosis was Cellulitis of right lower extremity. Diagnoses of Gas gangrene ([facility] [address] Chronic osteomyelitis of right foot ([facility] [address] and Post-op pain were also pertinent to this visit. has a past medical history of Diabetes mellitus ([facility] [address] has a past surgical history that includes Toe amputation (Right, 9/14/2020) and Foot Debridement (Right, 9/16/2020). Treatment Diagnosis: Decreased functional mobiltiy/ ADL status 2/2 decrease safety awareness      Restrictions  Position Activity Restriction  Other position/activity restrictions: Post op orders - NWB to right lower extremity with exception heel weight bearing for transfers only    Subjective   General  Chart Reviewed: Yes  Patient assessed for rehabilitation services?: Yes  Additional Pertinent Hx: Pt admitted 9/14 for R foot ulcer with unspecific severity. Sx 9/14: toe amputation (R hallux)     Sx 9/16: FOOT DEBRIDEMENT    PMH: DM  Family / Caregiver Present: No  Diagnosis: R foot ulcer with unspecific severity  Subjective  Subjective: Pt found in bed. Pt agreeable to OT eval and Tx. Pt observed to have slight confusion and contradiction of PLOF.   Patient Currently in Pain: Denies (pain did increase with activity, RN aware)  Social/Functional History  Social/Functional History  Lives With: Other (comment)(sister)  Type of Home: Apartment(1st floor)  Home Access: Stairs to enter with rails(5 stairs)  Entrance Stairs - Rails: Right  Bathroom Shower/Tub: Walk-in shower  Bathroom Toilet: Standard(sink next toilet)  Bathroom Equipment: Shower chair  Home Equipment: (none)  Receives Help From: Family  ADL Assistance: Independent  Homemaking Assistance: Needs assistance(sister did IADLs)  Ambulation Assistance: Independent  Active : No  Patient's  Info: sister drives  Occupation: Retired  Type of occupation: clean cars  Additional Comments: Pt denies falls     Objective  Treatment included functional transfer training, ADL's and pt. education. Vision: Within Functional Limits  Hearing: Within functional limits    Orientation  Overall Orientation Status: Within Functional Limits     Balance  Sitting Balance: Stand by assistance  Standing Balance: Dependent/Total(max A x1)  Standing Balance  Time: ~30 sec x 3  Activity: stance; functional transfers  Functional Mobility  Functional - Mobility Device: Standard Walker(transfer to wheelchair)  Activity: Other(bed<>chair)  Assist Level: Dependent/Total(Mod A x2)  Functional Mobility Comments: Pt denies dizziness or light headiness. Pt practiced hopping on LLE, with mod A x1. Pt demo functional step (on RLE heal and LLE) tranfers (2-5 steps), with mod A x2  Toilet Transfers  Toilet - Technique: Stand pivot  Equipment Used: Standard bedside commode  Toilet Transfer: Maximum assistance  Toilet Transfers Comments: simulated with chair transfer. v. cues for hand placement  ADL  Feeding: Setup; Beverage management  Grooming: Supervision;Setup; Increased time to complete;Verbal cueing(sitting in chair)  UE Dressing: Minimal assistance(gown management)  Toileting: Maximum assistance(Pt voided in bed.  Max A x1 for sreekanth-care)  Additional Comments: Pt demo difficulties with new learning and req mod cueing to complete activities. Tone RUE  RUE Tone: Normotonic  Tone LUE  LUE Tone: Normotonic  Coordination  Movements Are Fluid And Coordinated: Yes  Coordination and Movement description: Right UE;Left UE     Bed mobility  Rolling to Left: Minimal assistance  Supine to Sit: Minimal assistance(Assist with LEs, HOB elevated, handrail used for UE)  Sit to Supine: Unable to assess(Pt returned to chair)  Scooting: Stand by assistance(to EOB)  Transfers  Stand Pivot Transfers: Maximum assistance  Sit to stand: Maximum assistance  Stand to sit: Maximum assistance  Transfer Comments: Progressed to CGA with no device. Pt req increase time and verbal cues for safety. Cognition  Overall Cognitive Status: Exceptions  Arousal/Alertness: Appropriate responses to stimuli  Following Commands: Follows one step commands with repetition; Follows one step commands with increased time  Attention Span: Attends with cues to redirect  Safety Judgement: Decreased awareness of need for assistance;Decreased awareness of need for safety  Problem Solving: Assistance required to correct errors made  Insights: Decreased awareness of deficits  Initiation: Requires cues for some  Sequencing: Requires cues for some  Cognition Comment: difficulty remembering WB precautions      LUE AROM (degrees)  LUE AROM : WFL  Left Hand AROM (degrees)  Left Hand AROM: WFL  RUE AROM (degrees)  RUE AROM : WFL  Right Hand AROM (degrees)  Right Hand AROM: WFL  LUE Strength  Gross LUE Strength: WFL  L Hand General: 5/5  RUE Strength  Gross RUE Strength: WFL  R Hand General: 5/5     Plan   Plan  Times per week: 2-5x  Times per day: Daily  Current Treatment Recommendations: Strengthening, Balance Training, Functional Mobility Training, Endurance Training, Safety Education & Training, Self-Care / ADL, Cognitive/Perceptual Training    AM-PAC Score  AM-[facility] Inpatient Daily Activity Raw Score: 16 (09/17/20 1211)  -PAC Inpatient ADL T-Scale Score : 35.96 (09/17/20 1211)  ADL Inpatient CMS 0-100% Score: 53.32 (09/17/20 1211)  ADL Inpatient CMS G-Code Modifier : CK (09/17/20 1211)    Goals  Short term goals  Time Frame for Short term goals: at d/c  Short term goal 1: Stance for x5 min while completing an ADL, with supv  Short term goal 2: Functional transfer with supv  Short term goal 3: LE dressing with mod independence, AE prn  Short term goal 4: Identify 2  for home, with less than one verbal cue  Patient Goals   Patient goals : go home     Therapy Time   Individual Concurrent Group Co-treatment   Time In 1125         Time Out 1208         Minutes 43         Timed Code Treatment Minutes:   28 min     Total Treatment Minutes:  43 min     Gordon Polanco occupational therapy student     Therapist was present, directed the patients care, made skilled judgement and was responsible for assessment and treatment of the patient.      Sandeep Amaya OTR/L  5213\

## 2020-09-17 NOTE — CARE COORDINATION
Cm following, pt had fever, will cont to monitor fevers. Pt refuse SNF will DC with [employer]rs[employer] Haro in public benefits spoke with sister and sister will be bringing in ins card to verify benefits. Pt will DC with C to home with family.   Electronically signed by Hermelinda Malhotra RN on 9/17/2020 at 3:30 PM  [phone]

## 2020-09-17 NOTE — PLAN OF CARE
Problem: Safety:  Goal: Ability to remain free from injury will improve  Outcome: Ongoing  Note: Bed alarm on. Pt calls out appropriately when needing help. All belongings on bedside table within reach. Call light within reach. Bed in lowest and locked position. All needs met at this time. Will continue to monitor. Problem: Sensory:  Goal: Pain level will decrease  Outcome: Ongoing  Note: Pain level increased significantly after Percocet given. Patient now resting in bed with eyes closed. Both extremities elevated on pillows and patient repositioned and pulled up in bed. Problem: Infection - Surgical Site:  Goal: Will show no infection signs and symptoms  Description: Will show no infection signs and symptoms  Outcome: Ongoing  Note: Patient right foot wrapped in ace wrap. Ace wrap is clean, dry, intact with no drainage. Pt is afebrile. Pt is not showing any signs or symptoms of infection at this time. Will continue to monitor.

## 2020-09-17 NOTE — PROGRESS NOTES
ID Follow-up NOTE    CC:   R diabetic foot infection  Antibiotics: Zosyn, Linezolid, Fluconazole    Admit Date: 9/14/2020  Hospital Day: 4    Subjective:     Patient feels feel - no complaints eating. No problems earlier today when he had a fever     Objective:     Tm 101.9 at 925 this am    Patient Vitals for the past 8 hrs:   BP Temp Temp src Pulse Resp SpO2 Weight   09/17/20 1115 138/75 98.3 °F (36.8 °C) Oral 54 16 95 % --   09/17/20 0925 (!) 142/62 101.9 °F (38.8 °C) Oral 96 18 95 % --   09/17/20 0537 -- -- -- -- -- -- 232 lb 2.3 oz (105.3 kg)     I/O last 3 completed shifts: In: 795 [P.O.:360; I.V.:435]  Out: 1060 [Urine:1050; Blood:10]  I/O this shift: In: 56 [P.O.:480; I.V.:10]  Out: 575 [Urine:575]    EXAM:  GENERAL: No apparent distress.     HEENT: Membranes moist, no oral lesion  NECK:  Supple, no lymphadenopathy  LUNGS: Clear b/l, no rales, no dullness  CARDIAC: RRR, no murmur appreciated  ABD:  + BS, soft / NT  EXT:  No rash, no edema, no lesions  NEURO: No focal neurologic findings  PSYCH: Orientation, sensorium, mood normal  LINES:  Peripheral iv       Data Review:  Lab Results   Component Value Date    WBC 12.2 (H) 09/17/2020    HGB 12.9 (L) 09/17/2020    HCT 39.2 (L) 09/17/2020    MCV 75.7 (L) 09/17/2020     09/17/2020     Lab Results   Component Value Date    CREATININE 1.0 09/17/2020    BUN 10 09/17/2020     09/17/2020    K 3.9 09/17/2020     09/17/2020    CO2 24 09/17/2020       Hepatic Function Panel:   Lab Results   Component Value Date    ALKPHOS 117 09/14/2020    ALT 26 09/14/2020    AST 21 09/14/2020    PROT 7.8 09/14/2020    BILITOT 0.7 09/14/2020    LABALBU 3.1 09/14/2020 9/14 - ESR 98, .8     Micro:  9/14 Wound cult: light mixed skin sana, heavy P mirabilis, light K oxytoca  Proteus mirabilis (1)   Antibiotic  Interpretation  LUCAS    ampicillin  Sensitive  <=8  mcg/mL    ceFAZolin  Sensitive  <=2  mcg/mL    cefepime  Sensitive  <=2  mcg/mL    cefTRIAXone Sensitive  <=1  mcg/mL    cefuroxime  Sensitive  <=4  mcg/mL    ciprofloxacin  Sensitive  <=1  mcg/mL    ertapenem  Sensitive  <=0.5  mcg/mL    gentamicin  Sensitive  <=4  mcg/mL    meropenem  Sensitive  <=1  mcg/mL    piperacillin-tazobactam  Sensitive  <=16  mcg/mL    trimethoprim-sulfamethoxazole  Sensitive  <=2/38  mcg/mL       Klebsiella oxytoca (2)   Antibiotic  Interpretation  LUCAS    amoxicillin-clavulanate  Sensitive  <=8/4  mcg/mL    ampicillin  Resistant  >16  mcg/mL    ceFAZolin  Resistant  8  mcg/mL    cefepime  Sensitive  <=2  mcg/mL    cefTRIAXone  Sensitive  <=1  mcg/mL    cefuroxime  Sensitive  <=4  mcg/mL    ciprofloxacin  Sensitive  <=1  mcg/mL    ertapenem  Sensitive  <=0.5  mcg/mL    gentamicin  Sensitive  <=4  mcg/mL    meropenem  Sensitive  <=1  mcg/mL    piperacillin-tazobactam  Sensitive  <=16  mcg/mL    trimethoprim-sulfamethoxazole  Sensitive  <=2/38  mcg/mL       Imagin/14 R LE doppler ultrasound:   No evidence of deep vein or superficial thrombosis involving the right lower    extremity and the contralateral proximal common femoral vein.       R foot x-ray:  Soft tissue ulceration of the great toe ascites emphysema and with some mild irregularity of the cortical contour of the distal phalanx suspicious for acute osteomyelitis.     Scheduled Meds:   magnesium hydroxide  30 mL Oral Daily    insulin glargine  25 Units Subcutaneous Nightly    linezolid  600 mg Oral 2 times per day    fluconazole  100 mg Oral Daily    enoxaparin  40 mg Subcutaneous Daily    piperacillin-tazobactam  3.375 g Intravenous Q8H    sodium chloride flush  10 mL Intravenous 2 times per day    sennosides-docusate sodium  1 tablet Oral BID    famotidine  20 mg Oral BID    insulin lispro  0.08 Units/kg Subcutaneous TID WC    insulin lispro  0-6 Units Subcutaneous TID WC    insulin lispro  0-3 Units Subcutaneous Nightly     Continuous Infusions:   dextrose         PRN Meds:  oxyCODONE-acetaminophen, albuterol, albuterol, ammonium lactate, sodium chloride flush, potassium chloride, magnesium sulfate, acetaminophen **OR** acetaminophen, promethazine **OR** ondansetron, glucose, dextrose, glucagon (rDNA), dextrose      Assessment:     DM  R foot / leg cellulitis  R hallux wound / osteomyelitis    - s/p resection - 'definitive'   - polymicrobial     Skin - dry, question fungal dermatitis (extended from axilla, no tinea cruris)  No antibiotic allergies     FEVER - unclear source.       Plan:     Cont zosyn / linezolid / fluconazole   Moisturizer      Home on bactrim ds bid x 2 weeks (skin sana, GNR)  Home on fluconazole 100 po daily for same duration  Not candidate for home iv and not necessary (resection definitive)    F/u Podiatry - monitor / close f/u   F/u Derm    Monitor in hospital another day due to high temp this am      Discussed with pt, RN, Attending - Dr Saul Luciano MD

## 2020-09-17 NOTE — PROGRESS NOTES
Physical Therapy  Facility/Department: [facility] SURGERY  Re-evaluation/Treatment  NAME: Kamila Wing  : 1955  MRN: [id number]    Date of Service: 2020    Discharge Recommendations:    Kamila Wing scored a 10/24 on the AM-PAC short mobility form. Current research shows that an AM-PAC score of 17 or less is typically not associated with a discharge to the patient's home setting. Based on the patient's AM-PAC score and their current functional mobility deficits, it is recommended that the patient have 3-5 sessions per week of Physical Therapy at d/c to increase the patient's independence. Please see assessment section for further patient specific details. If patient discharges prior to next session this note will serve as a discharge summary. Please see below for the latest assessment towards goals. PT Equipment Recommendations  Equipment Needed: (determined by next LOC)    Assessment   Assessment: Pt is a 76 y/o male s/p R hallux partial amputation. Pt currently needing assist x 2 for transfers,and is functioning below baseline. Pt demonstrates only partial understanding of WB precautions of surgical foot. Pt required max VCs for transfer safety and constant assist for SW management. Pt would benefit from continued IP PT services. Treatment Diagnosis: impaired functional mobility with gait and transfers  PT Education: PT Role;Plan of Care;Weight-bearing Education;Transfer Training; Adaptive Device Training;Functional Mobility Training  Patient Education: Pt verbalized partial understanding. Reinforement recommended for WB precautions and SW management. Barriers to Learning: cognition  REQUIRES PT FOLLOW UP: Yes  Activity Tolerance  Activity Tolerance: Patient limited by fatigue;Patient limited by cognitive status; Patient limited by endurance     Patient Diagnosis(es): The primary encounter diagnosis was Cellulitis of right lower extremity.  Diagnoses of Gas gangrene ([address] Chronic osteomyelitis of right foot ([facility] [address] and Post-op pain were also pertinent to this visit. has a past medical history of Diabetes mellitus ([address] has a past surgical history that includes Toe amputation (Right, 9/14/2020) and Foot Debridement (Right, 9/16/2020). Restrictions  Position Activity Restriction  Other position/activity restrictions: Post op orders - NWB to right lower extremity with exception heel weight bearing for transfers only  Subjective   General  Additional Pertinent Hx: Pt [email or web address] on 9/14 with a R foot ulcer, partial amputation of R hallux performed on 9/14. LE dopplers (-) DVT;; PMHx: DM     Cognition   Cognition  Overall Cognitive Status: Exceptions  Arousal/Alertness: Appropriate responses to stimuli  Following Commands: Follows one step commands with repetition; Follows one step commands with increased time  Attention Span: Attends with cues to redirect  Safety Judgement: Decreased awareness of need for assistance;Decreased awareness of need for safety  Problem Solving: Assistance required to correct errors made  Insights: Decreased awareness of deficits  Sequencing: Requires cues for some  Cognition Comment: difficulty remembering WB precautions     Objective     Bed mobility - supine to sit with mod assist, HOB elevated, use of side rail    Transfers  Sit to Stand: 2 Person Assistance;Dependent/Total;Maximum Assistance x 2 Pt attempted multiple times to pull up on walker instead of using it to offload. Stand to sit: Moderate Assistance;2 Person Assistance(to chair and eob to control descent)    Lateral Transfers: (Pt performed lateral transfer from bed to wc with cg x 2, unable to perform from chair back to bed)  Pt did better with lateral transfers without AD as he did not seem to understand how to use the RW  Balance - max x 2 for balance with sit to stand and transfers using walker.       Ambulation  Ambulation?: No     AM-PAC Score  AM-PAC Inpatient Mobility Raw Score : 10 (09/17/20 1223)  AM-PAC Inpatient T-Scale Score : 32.29 (09/17/20 1223)  Mobility Inpatient CMS 0-100% Score: 76.75 (09/17/20 1223)  Mobility Inpatient CMS G-Code Modifier : CL (09/17/20 1223)          Goals  Short term goals  Time Frame for Short term goals: Discharge  Short term goal 1: sit <> supine with SBA  Short term goal 2: Bed to chair - lateral transfer with CG with heel WB on right  Short term goal 3: Pt will perform pivot transfer from chair to wheelchair with min A x 1 while maintaining WB precautions  Short term goal 4: Pt will verbalize understanding of WB precautions of surgical foot  Patient Goals   Patient goals : to return home    Plan    Plan  Times per week: 2-5  Times per day: Daily  Current Treatment Recommendations: Strengthening, Transfer Training, Stair training, Functional Mobility Training, Endurance Training, Wheelchair Mobility Training, Safety Education & Training, Home Exercise Program  Safety Devices  Type of devices: Left in chair, Nurse notified, Gait belt, Chair alarm in place, Call light within reach     Therapy Time   Individual Concurrent Group Co-treatment   Time In 1130         Time Out 1210         Minutes 40               Timed Code Treatment Minutes:   40    Total Treatment Minutes:  [place], [id number]

## 2020-09-17 NOTE — PROGRESS NOTES
Pt A/O x4, VSS. Pain has been well controlled with PRN Q6 percocet. Pt is x2 with a michelle steady. NWB on R foot. RLE covered with ace bandage- no drainage noted. Podus boots on both feet. Plan was to be discharged today, but pt ran a fever of 101.9 this AM. Staying another night for observation. Pt is comfortably resting at this time, call light within reach. Will continue to monitor.     Electronically signed by Argelia Haas RN on 9/17/2020 at 7:16 PM WDL

## 2020-09-17 NOTE — PROGRESS NOTES
Hospitalist Progress Note      PCP: Juan Carlos Leone    Date of Admission: 9/14/2020    Chief Complaint: R leg swelling    Subjective:  Patient seen and examined at the bedside. S/p OR yesterday. Feeling great today, no complaints. PFHS: reviewed as documented 9/14/2020, no changes    Medications:  Reviewed    Infusion Medications    dextrose       Scheduled Medications    insulin glargine  25 Units Subcutaneous Nightly    linezolid  600 mg Oral 2 times per day    fluconazole  100 mg Oral Daily    enoxaparin  40 mg Subcutaneous Daily    piperacillin-tazobactam  3.375 g Intravenous Q8H    sodium chloride flush  10 mL Intravenous 2 times per day    sennosides-docusate sodium  1 tablet Oral BID    famotidine  20 mg Oral BID    insulin lispro  0.08 Units/kg Subcutaneous TID WC    insulin lispro  0-6 Units Subcutaneous TID WC    insulin lispro  0-3 Units Subcutaneous Nightly     PRN Meds: albuterol, albuterol, ammonium lactate, oxyCODONE-acetaminophen **OR** oxyCODONE-acetaminophen, sodium chloride flush, potassium chloride, magnesium sulfate, acetaminophen **OR** acetaminophen, magnesium hydroxide, promethazine **OR** ondansetron, glucose, dextrose, glucagon (rDNA), dextrose      Intake/Output Summary (Last 24 hours) at 9/17/2020 0845  Last data filed at 9/17/2020 0330  Gross per 24 hour   Intake 795 ml   Output 1060 ml   Net -265 ml         Physical Exam Performed:    BP (!) 153/84   Pulse 87   Temp 100.1 °F (37.8 °C) (Oral)   Resp 16   Ht 6' 1\" (1.854 m)   Wt 232 lb 2.3 oz (105.3 kg)   SpO2 94%   BMI 30.63 kg/m²     General appearance:  No apparent distress, appears stated age  Eyes: Pupils equal, round, reactive to light, conjunctiva/corneas clear  Ears/Nose/Mouth/Throat: No external lesions or scars, hearing intact to voice  Neck: Trachea midline, no masses noted, no thyromegaly  Respiratory:  Normal respiratory effort.  Clear to auscultation bilaterally  Cardiovascular: Regular rate and sent  -Vancomycin/zosyn  -NPO at [place]  -OR w/ podiatry 9/14, and again 9/16  ID consulted, pt for bactrim and fluconazole x2w on dc   -Bactrim DS BID x2w   -Fluconazole QD x2w    # T2DM  -carb control diet when able  -Lantus initially 10, increased to 20 on 9/15  -mealtime and correctional insulin    DVT Prophylaxis: Lovenox  Diet: DIET CARB CONTROL;  Code Status: Full Code    PT/OT Eval Status: complete, recommending SNF, pt declining    Dispo - inpt, dc today, home with home care (pt declined SNF)    Edgar Arreola MD

## 2020-09-17 NOTE — PROGRESS NOTES
and interval fibular sesamoidectomy. Postoperative soft tissue swelling is present.              Impression    Impression:    Status post hallux amputation and sesamoidectomy. Narrative    Vascular Lower Extremities DVT Study Procedure         -- PRELIMINARY SONOGRAPHER REPORT --          Demographics          Patient Name       ATUL MCNAMARA          Date of Study      09/14/2020         Gender              Male          Patient Number     [id number]         Date of Birth       1955          Visit Number       [id number]          Age                 64 year(s)          Accession Number   [id number]         Room Number         B15          Corporate ID       [id number]            Sonographer         True, [address],                                                               BS, RVT          Ordering Physician Jayna Young  Interpreting        [facility]                        MD SEBASTIAN             Physician           Readers         Procedure         Type of Study:          Veins:Lower Extremities DVT Study, VL EXTREMITY VENOUS DUPLEX RIGHT.         Tech Comments    Right    There is no evidence of deep or superficial venous thrombosis involving the    right lower extremity. Left    There is no evidence of deep venous thrombosis involving the common femoral    vein.        ASSESSMENT/PLAN  1. 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 Cellulitis; right LE- improving  3.  Diabetes mellitus with peripheral neuropathy    -Patient examined and evaluated this am  -Febrile, hypertensive, otherwise VSS  -Leukocytosis improving- WBC 12.2 (14.0 on admission)  -.1, ESR 73  -XR imaging reviewed- impressions noted above  -VL venous Duplex reviewed- impression noted above  -Wound culture- 4+GPC, 3+GND, 2+GNR- Klebsiella oxytoca, proteus mirabilis  -Surgical pathology- pending  -PT/INR- 14.2/1.22  -Surgical dressing left clean, dry, and intact.   -Continue abx per ID recommendations- Bactrim x2 weeks on discharge  -Stable for discharge from podiatric standpoint, awaiting PT recommendations, discharge per primary once medically stable  -Patient will follow up with [address] outpatient podiatry clinic on Monday, 9/21. DISPO: s/p right hallux amputation with delayed primary closure 2/2 gas gangrene, acute osteomyelitis. Surgery felt to be definitive. Will be discharged on Bactrim x2 weeks. Stable for discharge from podiatric standpoint. Awaiting PT recommendations. Discharge per primary once medically stable. Will follow up in [address] outpatient podiatry clinic on Monday, 9/21.      Discussed assessment and plan with Dr. Oxana Mg DPM.    Jeanne Durant DPM  Podiatric Resident, PGY-1  Pager #: [phone] or Perfect Serve

## 2020-09-18 VITALS
SYSTOLIC BLOOD PRESSURE: 129 MMHG | HEIGHT: 73 IN | HEART RATE: 81 BPM | RESPIRATION RATE: 16 BRPM | TEMPERATURE: 99.2 F | OXYGEN SATURATION: 96 % | WEIGHT: 237.88 LBS | DIASTOLIC BLOOD PRESSURE: 84 MMHG | BODY MASS INDEX: 31.53 KG/M2

## 2020-09-18 LAB
ANION GAP SERPL CALCULATED.3IONS-SCNC: 9 MMOL/L (ref 3–16)
BUN BLDV-MCNC: 9 MG/DL (ref 7–20)
CALCIUM SERPL-MCNC: 8.4 MG/DL (ref 8.3–10.6)
CHLORIDE BLD-SCNC: 98 MMOL/L (ref 99–110)
CO2: 24 MMOL/L (ref 21–32)
CREAT SERPL-MCNC: 0.9 MG/DL (ref 0.8–1.3)
GFR AFRICAN AMERICAN: >60
GFR NON-AFRICAN AMERICAN: >60
GLUCOSE BLD-MCNC: 129 MG/DL (ref 70–99)
GLUCOSE BLD-MCNC: 157 MG/DL (ref 70–99)
GLUCOSE BLD-MCNC: 178 MG/DL (ref 70–99)
HCT VFR BLD CALC: 35 % (ref 40.5–52.5)
HEMOGLOBIN: 11.7 G/DL (ref 13.5–17.5)
MCH RBC QN AUTO: 24.9 PG (ref 26–34)
MCHC RBC AUTO-ENTMCNC: 33.4 G/DL (ref 31–36)
MCV RBC AUTO: 74.4 FL (ref 80–100)
PDW BLD-RTO: 15.5 % (ref 12.4–15.4)
PERFORMED ON: ABNORMAL
PERFORMED ON: ABNORMAL
PLATELET # BLD: 350 K/UL (ref 135–450)
PMV BLD AUTO: 7.3 FL (ref 5–10.5)
POTASSIUM REFLEX MAGNESIUM: 3.9 MMOL/L (ref 3.5–5.1)
RBC # BLD: 4.71 M/UL (ref 4.2–5.9)
SODIUM BLD-SCNC: 131 MMOL/L (ref 136–145)
WBC # BLD: 10.3 K/UL (ref 4–11)

## 2020-09-18 PROCEDURE — 6370000000 HC RX 637 (ALT 250 FOR IP): Performed by: PODIATRIST

## 2020-09-18 PROCEDURE — 36415 COLL VENOUS BLD VENIPUNCTURE: CPT

## 2020-09-18 PROCEDURE — 85027 COMPLETE CBC AUTOMATED: CPT

## 2020-09-18 PROCEDURE — 6370000000 HC RX 637 (ALT 250 FOR IP): Performed by: INTERNAL MEDICINE

## 2020-09-18 PROCEDURE — 80048 BASIC METABOLIC PNL TOTAL CA: CPT

## 2020-09-18 PROCEDURE — 99232 SBSQ HOSP IP/OBS MODERATE 35: CPT | Performed by: INTERNAL MEDICINE

## 2020-09-18 PROCEDURE — 6360000002 HC RX W HCPCS: Performed by: PODIATRIST

## 2020-09-18 PROCEDURE — 2580000003 HC RX 258: Performed by: PODIATRIST

## 2020-09-18 RX ADMIN — INSULIN LISPRO 9 UNITS: 100 INJECTION, SOLUTION INTRAVENOUS; SUBCUTANEOUS at 08:57

## 2020-09-18 RX ADMIN — LINEZOLID 600 MG: 600 TABLET, FILM COATED ORAL at 08:55

## 2020-09-18 RX ADMIN — INSULIN LISPRO 1 UNITS: 100 INJECTION, SOLUTION INTRAVENOUS; SUBCUTANEOUS at 08:58

## 2020-09-18 RX ADMIN — PIPERACILLIN AND TAZOBACTAM 3.38 G: 3; .375 INJECTION, POWDER, LYOPHILIZED, FOR SOLUTION INTRAVENOUS at 03:48

## 2020-09-18 RX ADMIN — Medication 10 ML: at 08:54

## 2020-09-18 RX ADMIN — INSULIN LISPRO 9 UNITS: 100 INJECTION, SOLUTION INTRAVENOUS; SUBCUTANEOUS at 12:41

## 2020-09-18 RX ADMIN — FLUCONAZOLE 100 MG: 200 TABLET ORAL at 08:55

## 2020-09-18 RX ADMIN — OXYCODONE HYDROCHLORIDE AND ACETAMINOPHEN 1 TABLET: 5; 325 TABLET ORAL at 03:56

## 2020-09-18 RX ADMIN — DOCUSATE SODIUM 50 MG AND SENNOSIDES 8.6 MG 1 TABLET: 8.6; 5 TABLET, FILM COATED ORAL at 08:55

## 2020-09-18 RX ADMIN — PIPERACILLIN AND TAZOBACTAM 3.38 G: 3; .375 INJECTION, POWDER, LYOPHILIZED, FOR SOLUTION INTRAVENOUS at 12:11

## 2020-09-18 RX ADMIN — OXYCODONE HYDROCHLORIDE AND ACETAMINOPHEN 1 TABLET: 5; 325 TABLET ORAL at 13:54

## 2020-09-18 RX ADMIN — ENOXAPARIN SODIUM 40 MG: 40 INJECTION SUBCUTANEOUS at 08:54

## 2020-09-18 RX ADMIN — MAGNESIUM HYDROXIDE 30 ML: 2400 SUSPENSION ORAL at 09:05

## 2020-09-18 RX ADMIN — FAMOTIDINE 20 MG: 20 TABLET, FILM COATED ORAL at 08:55

## 2020-09-18 ASSESSMENT — PAIN SCALES - GENERAL
PAINLEVEL_OUTOF10: 0
PAINLEVEL_OUTOF10: 10
PAINLEVEL_OUTOF10: 2
PAINLEVEL_OUTOF10: 0
PAINLEVEL_OUTOF10: 5
PAINLEVEL_OUTOF10: 10
PAINLEVEL_OUTOF10: 4

## 2020-09-18 ASSESSMENT — PAIN DESCRIPTION - ONSET
ONSET: ON-GOING
ONSET: ON-GOING

## 2020-09-18 ASSESSMENT — PAIN DESCRIPTION - LOCATION
LOCATION: TOE (COMMENT WHICH ONE)
LOCATION: TOE (COMMENT WHICH ONE)

## 2020-09-18 ASSESSMENT — PAIN DESCRIPTION - FREQUENCY
FREQUENCY: CONTINUOUS
FREQUENCY: CONTINUOUS

## 2020-09-18 ASSESSMENT — PAIN DESCRIPTION - DESCRIPTORS
DESCRIPTORS: ACHING
DESCRIPTORS: ACHING

## 2020-09-18 ASSESSMENT — PAIN - FUNCTIONAL ASSESSMENT
PAIN_FUNCTIONAL_ASSESSMENT: ACTIVITIES ARE NOT PREVENTED
PAIN_FUNCTIONAL_ASSESSMENT: ACTIVITIES ARE NOT PREVENTED

## 2020-09-18 ASSESSMENT — PAIN DESCRIPTION - PROGRESSION
CLINICAL_PROGRESSION: GRADUALLY WORSENING
CLINICAL_PROGRESSION: NOT CHANGED

## 2020-09-18 ASSESSMENT — PAIN DESCRIPTION - ORIENTATION
ORIENTATION: RIGHT
ORIENTATION: RIGHT

## 2020-09-18 ASSESSMENT — PAIN DESCRIPTION - PAIN TYPE
TYPE: SURGICAL PAIN
TYPE: SURGICAL PAIN

## 2020-09-18 NOTE — PROGRESS NOTES
Pt A&O x4. VSS. Afebrile. Mild fever of 100.1 but now temp is 99.3. Pain being controlled with PRN Percocet. RLE has dressing on it. Clean, dry, intact with no drainage. Elevated on multiple pillows with Prevlon boots on bilateral feel. Skin on abdomen is flaky and dry. Applied lotion. Pt tolerating diet without nausea or vomiting. Pt urinating adequately with no issues. IV infusing intermittent antibiiotics. All needs met at this time. Will continue to monitor.

## 2020-09-18 NOTE — PROGRESS NOTES
ID Follow-up NOTE    CC:   R diabetic foot infection  Antibiotics: Zosyn, Linezolid, Fluconazole    Admit Date: 9/14/2020  Hospital Day: 5    Subjective:     Patient feels feel - no complaints eating. No problems earlier today when he had a fever     Objective:     Tm 100.7 - 9/17 at 2142     Patient Vitals for the past 8 hrs:   BP Temp Temp src Pulse Resp SpO2   09/18/20 1120 (!) 144/89 99.4 °F (37.4 °C) Oral 82 16 94 %   09/18/20 0851 134/70 98.4 °F (36.9 °C) Oral 83 16 94 %     I/O last 3 completed shifts: In: 849 [P.O.:985; I.V.:10]  Out: 1175 [Urine:1175]  I/O this shift: In: 480 [P.O.:480]  Out: 575 [Urine:575]    EXAM:  GENERAL: No apparent distress.     HEENT: Membranes moist, no oral lesion  NECK:  Supple, no lymphadenopathy  LUNGS: Clear b/l, no rales, no dullness  CARDIAC: RRR, no murmur appreciated  ABD:  + BS, soft / NT  EXT:  No rash, no edema, no lesions - R foot with dressing   NEURO: No focal neurologic findings  PSYCH: Orientation, sensorium, mood normal  LINES:  Peripheral iv       Data Review:  Lab Results   Component Value Date    WBC 10.3 09/18/2020    HGB 11.7 (L) 09/18/2020    HCT 35.0 (L) 09/18/2020    MCV 74.4 (L) 09/18/2020     09/18/2020     Lab Results   Component Value Date    CREATININE 0.9 09/18/2020    BUN 9 09/18/2020     (L) 09/18/2020    K 3.9 09/18/2020    CL 98 (L) 09/18/2020    CO2 24 09/18/2020       Hepatic Function Panel:   Lab Results   Component Value Date    ALKPHOS 117 09/14/2020    ALT 26 09/14/2020    AST 21 09/14/2020    PROT 7.8 09/14/2020    BILITOT 0.7 09/14/2020    LABALBU 3.1 09/14/2020 9/14 - ESR 98, .8     Micro:  9/14 Wound cult: light mixed skin sana, heavy P mirabilis, light K oxytoca  Proteus mirabilis (1)   Antibiotic  Interpretation  LUCAS    ampicillin  Sensitive  <=8  mcg/mL    ceFAZolin  Sensitive  <=2  mcg/mL    cefepime  Sensitive  <=2  mcg/mL    cefTRIAXone  Sensitive  <=1  mcg/mL    cefuroxime  Sensitive  <=4  mcg/mL ciprofloxacin  Sensitive  <=1  mcg/mL    ertapenem  Sensitive  <=0.5  mcg/mL    gentamicin  Sensitive  <=4  mcg/mL    meropenem  Sensitive  <=1  mcg/mL    piperacillin-tazobactam  Sensitive  <=16  mcg/mL    trimethoprim-sulfamethoxazole  Sensitive  <=2/38  mcg/mL       Klebsiella oxytoca (2)   Antibiotic  Interpretation  LUCAS    amoxicillin-clavulanate  Sensitive  <=8/4  mcg/mL    ampicillin  Resistant  >16  mcg/mL    ceFAZolin  Resistant  8  mcg/mL    cefepime  Sensitive  <=2  mcg/mL    cefTRIAXone  Sensitive  <=1  mcg/mL    cefuroxime  Sensitive  <=4  mcg/mL    ciprofloxacin  Sensitive  <=1  mcg/mL    ertapenem  Sensitive  <=0.5  mcg/mL    gentamicin  Sensitive  <=4  mcg/mL    meropenem  Sensitive  <=1  mcg/mL    piperacillin-tazobactam  Sensitive  <=16  mcg/mL    trimethoprim-sulfamethoxazole  Sensitive  <=2/38  mcg/mL       Imagin/14 R LE doppler ultrasound:   No evidence of deep vein or superficial thrombosis involving the right lower    extremity and the contralateral proximal common femoral vein.       R foot x-ray:  Soft tissue ulceration of the great toe ascites emphysema and with some mild irregularity of the cortical contour of the distal phalanx suspicious for acute osteomyelitis.     Scheduled Meds:   magnesium hydroxide  30 mL Oral Daily    insulin glargine  25 Units Subcutaneous Nightly    linezolid  600 mg Oral 2 times per day    fluconazole  100 mg Oral Daily    enoxaparin  40 mg Subcutaneous Daily    piperacillin-tazobactam  3.375 g Intravenous Q8H    sodium chloride flush  10 mL Intravenous 2 times per day    sennosides-docusate sodium  1 tablet Oral BID    famotidine  20 mg Oral BID    insulin lispro  0.08 Units/kg Subcutaneous TID WC    insulin lispro  0-6 Units Subcutaneous TID WC    insulin lispro  0-3 Units Subcutaneous Nightly     Continuous Infusions:   dextrose         PRN Meds:  oxyCODONE-acetaminophen, albuterol, albuterol, ammonium lactate, sodium chloride flush, potassium chloride, magnesium sulfate, acetaminophen **OR** acetaminophen, promethazine **OR** ondansetron, glucose, dextrose, glucagon (rDNA), dextrose      Assessment:     DM  R foot / leg cellulitis  R hallux wound / osteomyelitis    - s/p resection - 'definitive'   - polymicrobial     Skin - dry, question fungal dermatitis (extended from axilla, no tinea cruris)  No antibiotic allergies     FEVER - unclear source.   'low grade', intermittent    Plan:     Cont zosyn / linezolid / fluconazole   Moisturizer     [facility] (skin sana, GNR)  Home on fluconazole 100 po daily for same duration  Not candidate for home iv and not necessary (resection definitive)    F/u Podiatry - monitor / close f/u   F/u Derm     Discussed with pt, sister, Attending - Dr Alirio Herring MD

## 2020-09-18 NOTE — PROGRESS NOTES
Podiatric Surgery Daily Progress Note  Kaz May      Subjective :   Patient seen and examined this am at the bedside. Patient denies any acute overnight events. Patient reports feeling well yesterday and overnight despite having mild fever on 3 separate readings. Patient denies N/V/F/C/SOB. Patient denies calf pain, thigh pain, chest pain. Review of Systems: A 12 point review of symptoms is unremarkable with the exception of the chief complaint. Patient specifically denies nausea, fever, vomiting, chills, shortness of breath, chest pain, abdominal pain, constipation or difficulty urinating. Objective     BP (!) 143/85   Pulse 84   Temp 98.7 °F (37.1 °C) (Oral)   Resp 18   Ht 6' 1\" (1.854 m)   Wt 237 lb 14 oz (107.9 kg)   SpO2 95%   BMI 31.38 kg/m²     I/O:    Intake/Output Summary (Last 24 hours) at 9/18/2020 0546  Last data filed at 9/18/2020 0359  Gross per 24 hour   Intake 995 ml   Output 1175 ml   Net -180 ml              Wt Readings from Last 3 Encounters:   09/18/20 237 lb 14 oz (107.9 kg)       LABS:    Recent Labs     09/17/20  0346 09/18/20  0508   WBC 12.2* 10.3   HGB 12.9* 11.7*   HCT 39.2* 35.0*    350        Recent Labs     09/17/20  0346      K 3.9      CO2 24   BUN 10   CREATININE 1.0        Recent Labs     09/16/20  1008 09/17/20  0346   INR 1.20* 1.22*           LOWER EXTREMITY EXAMINATION    Dressing to right LE intact. No strikethrough noted to the external dressing. Serosanguinous drainage noted to the internal layers of the dressing. VASCULAR: DP and PT pulses are palpable 2/4 b/l. CFT is brisk to the digits of the foot b/l. Skin temperature is warm to warm from proximal to distal with no focal calor noted. No edema noted. No pain with calf compression b/l. NEUROLOGIC: Gross and epicritic sensation is diminished b/l. Protective sensation is diminished at all pedal sites b/l.     DERMATOLOGIC: Surgical incision site well coapted with sutures intact. Overlying eschar cap noted to the incision site centrally. No drainage or malodor expressed from incision. No fluctuance or crepitus noted. No gapping or dehiscence noted. Erythema noted to the sreekanth incisional area dorsally. Skin temperature warm to warm from proximally to distal with left equal to right. No other open wounds noted. Images from 9/18. Verbal consent obtained for all images taken today. MUSCULOSKELETAL: Muscle strength deferred due to post op state. No pain with palpation of the foot or ankle b/l. Ankle joint ROM is decreased in dorsiflexion with the knee extended. No obvious biomechanical abnormalities. IMAGING:  Narrative    Exam: Right Foot, 3 views         History: s/p fibular sesamoidectomy, I&D and delayed primary closure         Comparison: 9/14/2020         Findings:    The patient is status post prior and fusion of the first digit at the first MTP and interval fibular sesamoidectomy. Postoperative soft tissue swelling is present.              Impression    Impression:    Status post hallux amputation and sesamoidectomy. ASSESSMENT/PLAN  1. S/p I&D with right hallux amputation 2/2 to gas gangrene  2. Cellulitis; RLE  3.  Diabetes mellitus with peripheral neuropathy    -Patient examined and evaluated this am  -VSS  -No leukocytosis - WBC 10.3 (14.0 on admission)  -.1, ESR 73  -XR imaging reviewed- impressions noted above  -VL venous Duplex reviewed- impression noted above  -Wound culture- 4+GPC, 3+GND, 2+GNR- Klebsiella oxytoca, proteus mirabilis  -Surgical pathology- pending  -Surgical incision site dressed with betadine soaked adaptic, gauze, ABD, kerlix, ACE  -Patient to be nonweight bearing to right lower extremity with exception of heel weight bearing only for transfers in post op shoe  -Post op shoe in room  -Continue abx per ID recommendations- Bactrim x2 weeks on discharge- prescription in chart  -Stable for discharge from podiatric standpoint, discharge per primary once medically stable  -Patient will follow up with [facility] outpatient podiatry clinic on Monday, 9/21. DISPO: S/p I&D with right hallux amputation 2/2 to gas gangrene. Was scheduled to be discharged yesterday however was febrile. Afebrile this am. WBC is decreasing. No acute signs of infection from surgical incision site. Cellulitis remains, however improving. OK to discharge from podiatry standpoint, discharge per primary once medially stable. Will follow up in [facility] outpatient podiatry clinic on Monday 9/21.     Discussed assessment and plan with Dr. Citlali Sears DPM.    Maria Fernanda Gannon DPM  Podiatric Resident, PGY-1  Pager #: [phone] or Perfect Serve

## 2020-09-18 NOTE — PROGRESS NOTES
Pt is alert and oriented. Vsstable. Pain being controlled with prn pain medication. Pt tolerating diet. POD changed bandage this am. Clean, dry, and intact. POdus on bilat feet. Possible discharge today, still waiting on precert. No new skin issues not listed on lda's. Will continue to monitor.

## 2020-09-18 NOTE — CARE COORDINATION
Case Management Assessment            Discharge Note                    Date / Time of Note: 9/18/2020 2:23 PM                  Discharge Note Completed by: Dominic Archibald    Patient Name: Adam Holt   YOB: 1955  Diagnosis: Right foot ulcer, with unspecified severity ([facility] [address] [L97.519]  Right foot ulcer, with unspecified severity ([address] [L97.519]   Date / Time: 9/14/2020 10:48 AM    Current PCP: David Hess patient: No    Hospitalization in the last 30 days: No    Advance Directives:  Code Status: Full Code  [place] DNR form completed and on chart: No    Financial:  Payor: MEDICARE / Plan: MEDICARE PART A AND B / Product Type: *No Product type* /      Pharmacy:  No Pharmacies [address] [place] medications?: Potential Assistance Purchasing Medications: No  Assistance provided by Case Management: None at this time    Does patient want to participate in local refill/ meds to beds program?: No    Meds To Beds General Rules:  1. Can ONLY be done Monday- Friday between 8:30am-5pm  2. Prescription(s) must be in pharmacy by 3pm to be filled same day  3. Copy of patient's insurance/ prescription drug card and patient face sheet must be sent along with the prescription(s)  4. Cost of Rx cannot be added to hospital bill. If financial assistance is needed, please contact unit  or ;  or  CANNOT provide pharmacy voucher for patients co-pays  5.  Patients can then  the prescription on their way out of the hospital at discharge, or pharmacy can deliver to the bedside if staff is available. (payment due at time of pick-up or delivery - cash, check, or card accepted)     Able to afford home medications/ co-pay costs: Yes    ADLS:  Current PT AM-PAC Score: 10 /24  Current OT AM-PAC Score: 16 /24      DISCHARGE Disposition: [place] (SNF): Liliana Alvaro Phone: [phone] Fax: [phone]    LOC at discharge: Skilled  ANDRES Completed: Yes    Notification completed in HENS/PAS?:  Yes : CM has completed HENS online through secure website for SNF admission at [facility]. Document ID #: [id number]    IMM Completed:   Yes, Case management has presented and reviewed [facility] letter #2 to the patient and/or family/ POA. Patient and/or family/POA verbalized understanding of their medicare rights and appeal process if needed. Patient and/or family/POA has signed, initialed and placed today's date (9/18/20) and time ([phone]) on IMM letter #2 on the the appropriate lines. Patient and/or family/POA, copy of letter offered and they are aware that this original copy of IMM letter #2 is available prior to discharge from the paper chart on the unit. Electronic documentation has been entered into epic for IMM letter #2 and original paper copy has been added to the paper chart at the nurses station. Transportation:  Transportation PLAN for discharge: EMS transportation   Mode of Transport: Ambulance stretcher - BLS  Reason for medical transport: Other: non wt bearing righ tfoot surgery  Name of [address]: [address]  Phone: [phone]  Time of Transport: 5pm    Transport form completed: Yes        Additional CM Notes: Pt from home with sister pt will Dc [facility] SNF, Floor to SNF Leila will take today orders faxed. The Plan for Transition of Care is related to the following treatment goals of Right foot ulcer, with unspecified severity ([address] [L97.519]  Right foot ulcer, with unspecified severity ([address] [L97.519]    The Patient and/or patient representative Houston Rodriguez and his family were provided with a choice of provider and agrees with the discharge plan Yes    Freedom of choice list was provided with basic dialogue that supports the patient's individualized plan of care/goals and shares the quality data associated with the providers.  Yes    Care Transitions patient: No    Taylor Guevara RN  [facility]  Case Management Department  Ph: [phone]  Fax: [phone]

## 2020-09-18 NOTE — DISCHARGE SUMMARY
suspicious for acute osteomyelitis. VL Extremity Venous Right   Final Result             Consults:     IP CONSULT TO PODIATRY  PHARMACY TO DOSE VANCOMYCIN  IP CONSULT TO HOSPITALIST  PHARMACY TO DOSE VANCOMYCIN  IP CONSULT TO PHARMACY  IP CONSULT TO INFECTIOUS DISEASES    Disposition:  Home w/ home care (pt declined SNF as recommended by PT/OT)    Condition at Discharge: Stable    Discharge Instructions/Follow-up:  Follow up with PCP in 1 week for hospital follow-up and to review any pending labs/tests. Please follow other discharge instructions as outlined in the discharge instructions    Code Status:  Full Code    Activity: activity as tolerated    Diet: diabetic diet    Discharge Medications:     Current Discharge Medication List           Details   sulfamethoxazole-trimethoprim (BACTRIM DS;SEPTRA DS) 800-160 MG per tablet Take 1 tablet by mouth 2 times daily for 14 days  Qty: 28 tablet, Refills: 0      fluconazole (DIFLUCAN) 100 MG tablet Take 1 tablet by mouth daily for 14 days  Qty: 14 tablet, Refills: 0      HYDROcodone-acetaminophen (NORCO) 5-325 MG per tablet Take 1 tablet by mouth every 6 hours as needed for Pain for up to 5 days. Intended supply: 5 days.  Take lowest dose possible to manage pain  Qty: 20 tablet, Refills: 0    Comments: Reduce doses taken as pain becomes manageable  Associated Diagnoses: Post-op pain              Details   glipiZIDE (GLUCOTROL) 10 MG tablet Take 10 mg by mouth 2 times daily (before meals)      clopidogrel (PLAVIX) 75 MG tablet Take 75 mg by mouth daily      linagliptin (TRADJENTA) 5 MG tablet Take 5 mg by mouth daily      Cholecalciferol (VITAMIN D) 50 MCG (2000 UT) CAPS capsule Take 2,000 Units by mouth daily      atorvastatin (LIPITOR) 40 MG tablet Take 40 mg by mouth daily      lisinopril (PRINIVIL;ZESTRIL) 40 MG tablet Take 40 mg by mouth daily      metFORMIN (GLUCOPHAGE) 1000 MG tablet Take 1,000 mg by mouth 2 times daily (with meals)      aspirin 81 MG EC tablet Take 81 mg by mouth daily      insulin glargine (LANTUS SOLOSTAR) 100 UNIT/ML injection pen Inject 21 Units into the skin nightly             Time Spent on discharge is 35 minutes in the examination, evaluation, counseling and review of medications and discharge plan. Signed:    Alexandr Bobo MD   9/18/2020      Thank you Gaile Dandy for the opportunity to be involved in this patient's care. If you have any questions or concerns please feel free to contact me at [phone].

## 2020-09-18 NOTE — PLAN OF CARE
Problem: Falls - Risk of:  Goal: Will remain free from falls  Description: Will remain free from falls  9/18/2020 0110 by Ava Mendoza RN  Outcome: Ongoing  Note: Pt bed alarm on. RN re-educating patient to call if needs help. Patient has been calling out appropriately this shift. Nonskid footwear on left foot. Bed in lowest and locked position. Call light within reach. Bed side table with belongings within reach. Problem: Fluid Volume:  Goal: Will show no signs or symptoms of fluid imbalance  Description: Will show no signs or symptoms of fluid imbalance  Outcome: Ongoing  Note: Pt voiding adequately > 30 mL/hr. Tolerating diet and liquids without any nausea or vomiting. Problem: Sensory:  Goal: Pain level will decrease  Description: Pain level will decrease  9/18/2020 0110 by Ava Mendoza RN  Outcome: Ongoing  Note: Pain level decreases with elevation of LLE and medication Percocet given as needed.

## 2020-09-18 NOTE — DISCHARGE INSTR - COC
Continuity of Care Form    Patient Name: Kamila Wing   :  1955  MRN:  [id number]    Admit date:  2020  Discharge date:  ***    Code Status Order: Full Code   Advance Directives:   Advance Care Flowsheet Documentation     Date/Time Healthcare Directive Type of Healthcare Directive Copy in [address] Agent's Name Healthcare Agent's Phone Number    [phone]  No, patient does not have an advance directive for healthcare treatment -- -- -- -- --          Admitting Physician:  Ilda Hurtado MD  PCP: Kyle Boucher    Discharging Nurse: [facility] Unit/Room#: 3492/3658-51  Discharging Unit Phone Number: ***    Emergency Contact:   Extended Emergency Contact Information  Primary Emergency Contact: Geraldine Yoo, [address] Phone: [phone]  Relation: Brother/Sister    Past Surgical History:  Past Surgical History:   Procedure Laterality Date    FOOT DEBRIDEMENT Right 2020    INCISION AND DRAINAGE WITH FIBULAR SEASMOIDECTOMY AND DELAYED PRIMARY CLOSURE RIGHT FOOT performed by Juni Temple DPM at [address] TOE AMPUTATION Right 2020    [address] performed by Juni Temple DPM at [address]       Immunization History:   Immunization History   Administered Date(s) Administered    Pneumococcal Conjugate 13-valent ([id number]) 2019    Pneumococcal Polysaccharide ([id number]) 2016    Tdap (Boostrix, Adacel) 2016       Active Problems:  Patient Active Problem List   Diagnosis Code    Right foot ulcer, with unspecified severity ([facility] L97.519       Isolation/Infection:   Isolation          No Isolation        Patient Infection Status     Infection Onset Added Last Indicated Last Indicated By Review Planned Expiration Resolved Resolved By    None active    Resolved    COVID-19 Rule Out 20 COVID-19 (Ordered)   20 Rule-Out Test Resulted          Nurse Assessment:  Last Vital Signs: BP 134/70   Pulse 83   Temp 98.4 °F (36.9 °C) (Oral)   Resp 16   Ht 6' 1\" (1.854 m)   Wt 237 lb 14 oz (107.9 kg)   SpO2 94%   BMI 31.38 kg/m²     Last documented pain score (0-10 scale): Pain Level: 0  Last Weight:   Wt Readings from Last 1 Encounters:   09/18/20 237 lb 14 oz (107.9 kg)     Mental Status:  oriented and alert    IV Access:  - None    Nursing Mobility/ADLs:  Walking   Assisted  Transfer  Assisted  Bathing  Assisted  Dressing  Assisted  Toileting  Independent  Feeding  [address]  Independent  Med Delivery   whole    Wound Care Documentation and Therapy:        Elimination:  Continence:   · Bowel: Yes  · Bladder: Yes  Urinary Catheter: None   Colostomy/Ileostomy/Ileal Conduit: No       Date of Last BM: ***    Intake/Output Summary (Last 24 hours) at 9/18/2020 0926  Last data filed at 9/18/2020 0914  Gross per 24 hour   Intake 1235 ml   Output 1750 ml   Net -515 ml     I/O last 3 completed shifts: In: 409 [P.O.:985; I.V.:10]  Out: 1175 [Urine:1175]    Safety Concerns: At Risk for Falls    Impairments/Disabilities:      None    Nutrition Therapy:  Current Nutrition Therapy:   - Oral Diet:  Carb Control 3 carbs/meal (1500kcals/day)    Routes of Feeding: Oral  Liquids: No Restrictions  Daily Fluid Restriction: no  Last Modified Barium Swallow with Video (Video Swallowing Test): not done    Treatments at the Time of Hospital Discharge:   Respiratory Treatments: ***  Oxygen Therapy:  is not on home oxygen therapy.   Ventilator:    - No ventilator support    Rehab Therapies: Physical Therapy and Occupational Therapy  Weight Bearing Status/Restrictions: Partial weight bearing (30-50%) only on leg right leg  Other Medical Equipment (for information only, NOT a DME order):  wheelchair and walker  Other Treatments: ***    Patient's personal belongings (please select all that are sent with patient):  {[facility] DME Belongings:[id number]}    RN SIGNATURE:  Electronically signed by Belkys Paez RN on 9/18/20 at 9:27 AM EDT    CASE MANAGEMENT/SOCIAL WORK SECTION    Inpatient Status Date: ***    Readmission Risk Assessment Score:  Readmission Risk              Risk of Unplanned Readmission:        15           Discharging to Facility/ [address] [place] [address]       Phone: [phone]       Fax: [phone]        ·     / signature: Electronically signed by Acosta Sanchez RN on 9/18/20 at 2:02 PM EDT    PHYSICIAN SECTION    Prognosis: Good    Condition at Discharge: Stable    Rehab Potential (if transferring to Rehab): Good    Recommended Labs or Other Treatments After Discharge: PT OT eval and treat,     Physician Certification: I certify the above information and transfer of Luciana Olivo  is necessary for the continuing treatment of the diagnosis listed and that he requires [facility] for less 30 days.      Update Admission H&P: No change in H&P    PHYSICIAN SIGNATURE:  Edgar Arreola MD

## 2020-09-18 NOTE — PROGRESS NOTES
Hospitalist Progress Note      PCP: Keny Benavidez    Date of Admission: 9/14/2020    Chief Complaint: R leg swelling    Subjective:  Patient seen and examined at the bedside. No complaints at this time. One low grade fever overnight, but feeling well. PFHS: reviewed as documented 9/14/2020, no changes    Medications:  Reviewed    Infusion Medications    dextrose       Scheduled Medications    magnesium hydroxide  30 mL Oral Daily    insulin glargine  25 Units Subcutaneous Nightly    linezolid  600 mg Oral 2 times per day    fluconazole  100 mg Oral Daily    enoxaparin  40 mg Subcutaneous Daily    piperacillin-tazobactam  3.375 g Intravenous Q8H    sodium chloride flush  10 mL Intravenous 2 times per day    sennosides-docusate sodium  1 tablet Oral BID    famotidine  20 mg Oral BID    insulin lispro  0.08 Units/kg Subcutaneous TID WC    insulin lispro  0-6 Units Subcutaneous TID WC    insulin lispro  0-3 Units Subcutaneous Nightly     PRN Meds: oxyCODONE-acetaminophen, albuterol, albuterol, ammonium lactate, sodium chloride flush, potassium chloride, magnesium sulfate, acetaminophen **OR** acetaminophen, promethazine **OR** ondansetron, glucose, dextrose, glucagon (rDNA), dextrose      Intake/Output Summary (Last 24 hours) at 9/18/2020 1203  Last data filed at 9/18/2020 1122  Gross per 24 hour   Intake 985 ml   Output 1175 ml   Net -190 ml         Physical Exam Performed:    BP (!) 144/89   Pulse 82   Temp 99.4 °F (37.4 °C) (Oral)   Resp 16   Ht 6' 1\" (1.854 m)   Wt 237 lb 14 oz (107.9 kg)   SpO2 94%   BMI 31.38 kg/m²     General appearance:  No apparent distress, appears stated age  Eyes: Pupils equal, round, reactive to light, conjunctiva/corneas clear  Ears/Nose/Mouth/Throat: No external lesions or scars, hearing intact to voice  Neck: Trachea midline, no masses noted, no thyromegaly  Respiratory:  Normal respiratory effort.  Clear to auscultation bilaterally  Cardiovascular: Regular rate and rhythm, nl S1/S2, w/o murmurs, rubs or gallops, no lower extremity edema  Abdomen: Soft, non-tender, non-distended, no hepatosplenomegaly  Musculoskeletal: No cyanosis, clubbing or petechiae, no lower extremity misalignment, asymmetry, or crepitation  Skin: Normal skin color, texture, turgor. R foot wrapped, dressing c/d/i,   Psychiatric: Alert and oriented x4, good insight and judgment    Labs:   Recent Labs     09/16/20  0518 09/17/20  0346 09/18/20  0508   WBC 13.9* 12.2* 10.3   HGB 12.8* 12.9* 11.7*   HCT 38.4* 39.2* 35.0*    358 350     Recent Labs     09/16/20  0518 09/17/20  0346 09/18/20  0508   * 136 131*   K 4.4 3.9 3.9    101 98*   CO2 25 24 24   BUN 14 10 9   CREATININE 1.0 1.0 0.9   CALCIUM 8.9 8.8 8.4     No results for input(s): AST, ALT, BILIDIR, BILITOT, ALKPHOS in the last 72 hours. Recent Labs     09/16/20  1008 09/17/20  0346   INR 1.20* 1.22*     No results for input(s): Darrin Dante in the last 72 hours. Urinalysis:    No results found for: Andres Done, BACTERIA, RBCUA, BLOODU, SPECGRAV, [address]    Radiology:  XR FOOT RIGHT (MIN 3 VIEWS)   Final Result   Impression:    Status post hallux amputation and sesamoidectomy. XR FOOT RIGHT (MIN 3 VIEWS)   Final Result   Impression:    Status post hallux amputation. XR CHEST PORTABLE   Final Result   1. No acute cardiopulmonary abnormalities. XR FOOT RIGHT (MIN 3 VIEWS)   Final Result      1. Soft tissue ulceration of the great toe ascites emphysema and with some mild irregularity of the cortical contour of the distal phalanx suspicious for acute osteomyelitis.       VL Extremity Venous Right   Final Result          Assessment/Plan:    Active Hospital Problems    Diagnosis Date Noted    Right foot ulcer, with unspecified severity [facility]) [L97.519] 09/14/2020       Plan:    # Lower extremity ulcer and cellulitis  -duplex ruled out DVT  -podiatry

## 2020-09-18 NOTE — PLAN OF CARE
Problem: Falls - Risk of:  Goal: Will remain free from falls  Description: Will remain free from falls  9/18/2020 0923 by Evelyn Rouse RN  Outcome: Ongoing  Note: Pt is A&Ox4, is high fall risk, Pt educated and encouraged to use call light to notify and wait for assistance from staff before getting OOB, pt uses call light appropriately, has made no unsafe movements, no attempts to get OOB without assistance. Pt's bed alarm remained on throughout shift, bed in lowest position, 2/4 side rails up, call light and bedside table within reach. No falls so far this shift, will continue to monitor. Problem: Infection - Surgical Site:  Goal: Will show no infection signs and symptoms  Description: Will show no infection signs and symptoms  Outcome: Ongoing  Note: Will continue monitor pt vs, labs, surgical sites, and other indictors of infection.

## 2020-09-21 LAB
GRAM STAIN RESULT: ABNORMAL
ORGANISM: ABNORMAL
WOUND/ABSCESS: ABNORMAL

## 2020-10-02 ENCOUNTER — OFFICE VISIT (OUTPATIENT)
Dept: INTERNAL MEDICINE CLINIC | Age: 65
End: 2020-10-02
Payer: MEDICARE

## 2020-10-02 PROCEDURE — 99213 OFFICE O/P EST LOW 20 MIN: CPT | Performed by: PODIATRIST

## 2020-10-02 PROCEDURE — 11000 DBRDMT ECZ/INFECTED SKIN<10%: CPT

## 2020-10-02 RX ORDER — CEPHALEXIN 500 MG/1
500 CAPSULE ORAL 4 TIMES DAILY
Qty: 28 CAPSULE | Refills: 0 | Status: SHIPPED | OUTPATIENT
Start: 2020-10-02 | End: 2020-10-09

## 2020-10-02 NOTE — PROGRESS NOTES
Department of Podiatry  Resident Progress Note    [employer]  Allergies: Patient has no known allergies. SUBJECTIVE  The patient is a 77 y.o. 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 Patient states he tried to follow-up in clinic last Monday however was told the paperwork was not correct and was sent back to his nursing facility. Patient says the dressing has remained clean dry and intact since his surgery. Patient states that physical therapy at his nursing home tries to get him to walk but he tells them that he is supposed to be nonweightbearing. He says otherwise he is stayed off of his feet. He denies any drainage through his dressing. He denies any pain to his feet at this time. Denies any nausea, vomiting, fever, chills, shortness of breath, chest pain. Past Medical History:        Diagnosis Date    Diabetes mellitus ([facility] [address]     MRSA (methicillin resistant staph aureus) culture positive 09/14/2020    foot       REVIEW OF SYSTEMS:  Review of Systems: A 12 point review of systems is unremarkable with the exception of the chief complaint. Patient specifically denies nausea, vomiting, fever, chills, shortness of breath, chest pain, abdominal pain, constipation, or difficulty urinating. OBJECTIVE  Patient presents to clinic in a wheelchair with surgical shoe to the right foot. AOx3 and NAD. VASCULAR: DP and PT pulses are palpable 2/4 b/l. CFT is brisk to the digits of the foot b/l. Skin temperature is warm to warm from proximal to distal with no focal calor noted. No edema noted. No pain with calf compression b/l. NEUROLOGIC: Gross and epicritic sensation is diminished b/l. Protective sensation is diminished at all pedal sites b/l. DERMATOLOGIC: Dehiscence noted to the surgical incision site of the right distal medial foot spanning the length of the incision with loose black eschar overlying.  After removal of eschar, fibrotic tissue noted throughout wound with first metatarsal head exposed. Wound probes to bone. No drainage expressed from the wound at this time. No malodor. No fluctuance. No surrounding erythema. No acute signs of infection at this time. MUSCULOSKELETAL: Muscle strength deferred at this time due to postop state. No pain with palpation of the foot or ankle b/l. Ankle joint ROM is decreased in dorsiflexion with the knee extended. S/p hallux amputation. ASSESSMENT  1. Wound dehiscence  2. 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 Diabetes mellitus type 2 with ulceration     PLAN  -Evaluation and management x 15 minutes and greater than 50% of the time spent explaining the etiology and treatment with the patient.   -Using a #15 blade I sharply excised the necrotic tissue overlying the surgical incision site, and sharply excised all necrotic and nonviable tissue down to and including subcutaneous tissue. Less than 5 cc of bleeding noted. Hemostasis was achieved. Patient tolerated well. -XR right foot ordered. -Rx for Keflex for 7 days. No acute signs of infection this time however because bone is exposed antibiotic was prescribed  -Wound packed with saline soaked gauze, gauze, ABD, Kerlix, Ace bandage  -Instructions dispensed to patient for dressing changes performed twice daily at nursing facility as described above. -Patient to remain nonweightbearing to the right lower extremity at all times.  -Instructed patient to tell physical therapy he is supposed to remain nonweightbearing.   -Patient will return to clinic in 1 week.     Discussed patient assessment and plan with ALEXANDRA Salcido DPM  Podiatric Resident, PGY-1  Pager #: [phone] or Perfect Serve

## 2020-10-07 ENCOUNTER — ANESTHESIA EVENT (OUTPATIENT)
Dept: OPERATING ROOM | Age: 65
End: 2020-10-07
Payer: MEDICARE

## 2020-10-07 NOTE — PROGRESS NOTES
Surgery date moved several times. Late add-on. Spoke with Yessenia Melchor nurse at [facility]. Kolton Fiore [phone]. Reviewed medical history since discharge on 9/18. She will send PO/ medication list, full code, face sheet, and check-off sheet from [facility] with patient DOS. Nurse stated patient had plavix and aspirin today. Instructed nurse not to gie any medications DOS. LMOR at surgeon's- notified of patient taking blood thinners today.

## 2020-10-07 NOTE — PROGRESS NOTES
[facility] / [facility] ([facility]) [address] [facility], [address]    Acknowledgment of Informed Consent for Surgical or Medical Procedure and Sedation  I agree to allow doctor(s) KASI GUIDO and his/her associates or assistants, including residents and/or other qualified medical practitioner to perform the following medical treatment or procedure and to administer or direct the administration of sedation as necessary:  Procedure(s): RIGHT FOOT INCISION AND DEBRIDEMENT/ POSSIBLE TRANSMETATARSAL OSTEOTOMY  My doctor has explained the following regarding the proposed procedure:   the explanation of the procedure   the benefits of the procedure   the potential problems that might occur during recuperation   the risks and side effects of the procedure which could include but are not limited to severe blood loss, infection, stroke or death   the benefits, risks and side effect of alternative procedures including the consequences of declining this procedure or any alternative procedures   the likelihood of achieving satisfactory results. I acknowledge no guarantee or assurance has been made to me regarding the results. I understand that during the course of this treatment/procedure, unforeseen conditions can occur which require an additional or different procedure. I agree to allow my physician or assistants to perform such extension of the original procedure as they may find necessary. I understand that sedation will often result in temporary impairment of memory and fine motor skills and that sedation can occasionally progress to a state of deep sedation or general anesthesia. I understand the risks of anesthesia for surgery include, but are not limited to, sore throat, hoarseness, injury to face, mouth, or teeth; nausea; headache; injury to blood vessels or nerves; death, brain damage, or paralysis.     I understand that if I have a Limitation of Treatment order in effect during my hospitalization, the order may or may not be in effect during this procedure. I give my doctor permission to give me blood or blood products. I understand that there are risks with receiving blood such as hepatitis, AIDS, fever, or allergic reaction. I acknowledge that the risks, benefits, and alternatives of this treatment have been explained to me and that no express or implied warranty has been given by the hospital, any blood bank, or any person or entity as to the blood or blood components transfused. At the discretion of my doctor, I agree to allow observers, equipment/product representatives and allow photographing, and/or televising of the procedure, provided my name or identity is maintained confidentially. I agree the hospital may dispose of or use for scientific or educational purposes any tissue, fluid, or body parts which may be removed.     ________________________________Date________Time______ am/pm  ([facility])  Patient or Signature of Closest Relative or Legal Guardian    ________________________________Date________Time______am/pm      Page 1 of  1  Witness

## 2020-10-08 ENCOUNTER — HOSPITAL ENCOUNTER (OUTPATIENT)
Age: 65
Setting detail: SURGERY ADMIT
Discharge: HOME | End: 2020-10-08
Attending: PODIATRIST | Admitting: PODIATRIST
Payer: MEDICARE

## 2020-10-08 ENCOUNTER — APPOINTMENT (OUTPATIENT)
Dept: GENERAL RADIOLOGY | Age: 65
End: 2020-10-08
Attending: PODIATRIST
Payer: MEDICARE

## 2020-10-08 ENCOUNTER — ANESTHESIA (OUTPATIENT)
Dept: OPERATING ROOM | Age: 65
End: 2020-10-08
Payer: MEDICARE

## 2020-10-08 VITALS — DIASTOLIC BLOOD PRESSURE: 87 MMHG | TEMPERATURE: 61.3 F | SYSTOLIC BLOOD PRESSURE: 130 MMHG | OXYGEN SATURATION: 98 %

## 2020-10-08 VITALS
DIASTOLIC BLOOD PRESSURE: 77 MMHG | HEIGHT: 73 IN | OXYGEN SATURATION: 100 % | SYSTOLIC BLOOD PRESSURE: 125 MMHG | RESPIRATION RATE: 18 BRPM | BODY MASS INDEX: 31.38 KG/M2 | TEMPERATURE: 96.8 F | HEART RATE: 93 BPM

## 2020-10-08 LAB
GLUCOSE BLD-MCNC: 186 MG/DL (ref 70–99)
GLUCOSE BLD-MCNC: 63 MG/DL (ref 70–99)
PERFORMED ON: ABNORMAL
PERFORMED ON: ABNORMAL

## 2020-10-08 PROCEDURE — 88309 TISSUE EXAM BY PATHOLOGIST: CPT

## 2020-10-08 PROCEDURE — 6360000002 HC RX W HCPCS: Performed by: NURSE ANESTHETIST, CERTIFIED REGISTERED

## 2020-10-08 PROCEDURE — 87116 MYCOBACTERIA CULTURE: CPT

## 2020-10-08 PROCEDURE — 87102 FUNGUS ISOLATION CULTURE: CPT

## 2020-10-08 PROCEDURE — 7100000000 HC PACU RECOVERY - FIRST 15 MIN: Performed by: PODIATRIST

## 2020-10-08 PROCEDURE — 7100000011 HC PHASE II RECOVERY - ADDTL 15 MIN: Performed by: PODIATRIST

## 2020-10-08 PROCEDURE — 3700000000 HC ANESTHESIA ATTENDED CARE: Performed by: PODIATRIST

## 2020-10-08 PROCEDURE — 2580000003 HC RX 258: Performed by: NURSE ANESTHETIST, CERTIFIED REGISTERED

## 2020-10-08 PROCEDURE — 2500000003 HC RX 250 WO HCPCS: Performed by: NURSE ANESTHETIST, CERTIFIED REGISTERED

## 2020-10-08 PROCEDURE — 87070 CULTURE OTHR SPECIMN AEROBIC: CPT

## 2020-10-08 PROCEDURE — 3600000014 HC SURGERY LEVEL 4 ADDTL 15MIN: Performed by: PODIATRIST

## 2020-10-08 PROCEDURE — 87205 SMEAR GRAM STAIN: CPT

## 2020-10-08 PROCEDURE — 2580000003 HC RX 258: Performed by: PODIATRIST

## 2020-10-08 PROCEDURE — 2780000010 HC IMPLANT OTHER: Performed by: PODIATRIST

## 2020-10-08 PROCEDURE — 2580000003 HC RX 258: Performed by: ANESTHESIOLOGY

## 2020-10-08 PROCEDURE — 3600000004 HC SURGERY LEVEL 4 BASE: Performed by: PODIATRIST

## 2020-10-08 PROCEDURE — 88311 DECALCIFY TISSUE: CPT

## 2020-10-08 PROCEDURE — 73630 X-RAY EXAM OF FOOT: CPT

## 2020-10-08 PROCEDURE — 87015 SPECIMEN INFECT AGNT CONCNTJ: CPT

## 2020-10-08 PROCEDURE — 3700000001 HC ADD 15 MINUTES (ANESTHESIA): Performed by: PODIATRIST

## 2020-10-08 PROCEDURE — 6360000002 HC RX W HCPCS: Performed by: PODIATRIST

## 2020-10-08 PROCEDURE — 87206 SMEAR FLUORESCENT/ACID STAI: CPT

## 2020-10-08 PROCEDURE — 2500000003 HC RX 250 WO HCPCS: Performed by: PODIATRIST

## 2020-10-08 PROCEDURE — 2709999900 HC NON-CHARGEABLE SUPPLY: Performed by: PODIATRIST

## 2020-10-08 PROCEDURE — 87176 TISSUE HOMOGENIZATION CULTR: CPT

## 2020-10-08 PROCEDURE — U0003 INFECTIOUS AGENT DETECTION BY NUCLEIC ACID (DNA OR RNA); SEVERE ACUTE RESPIRATORY SYNDROME CORONAVIRUS 2 (SARS-COV-2) (CORONAVIRUS DISEASE [COVID-19]), AMPLIFIED PROBE TECHNIQUE, MAKING USE OF HIGH THROUGHPUT TECHNOLOGIES AS DESCRIBED BY CMS-2020-01-R: HCPCS

## 2020-10-08 PROCEDURE — 7100000001 HC PACU RECOVERY - ADDTL 15 MIN: Performed by: PODIATRIST

## 2020-10-08 PROCEDURE — 7100000010 HC PHASE II RECOVERY - FIRST 15 MIN: Performed by: PODIATRIST

## 2020-10-08 DEVICE — ALLOGRAFT HUM TISS 1 CC AMNIO TISS MEMBRN AMNIFLO CRYOPRES: Type: IMPLANTABLE DEVICE | Site: FOOT | Status: FUNCTIONAL

## 2020-10-08 RX ORDER — LABETALOL 20 MG/4 ML (5 MG/ML) INTRAVENOUS SYRINGE
5 EVERY 10 MIN PRN
Status: DISCONTINUED | OUTPATIENT
Start: 2020-10-08 | End: 2020-10-08 | Stop reason: HOSPADM

## 2020-10-08 RX ORDER — SUCCINYLCHOLINE/SOD CL,ISO/PF 200MG/10ML
SYRINGE (ML) INTRAVENOUS PRN
Status: DISCONTINUED | OUTPATIENT
Start: 2020-10-08 | End: 2020-10-08 | Stop reason: SDUPTHER

## 2020-10-08 RX ORDER — FENTANYL CITRATE 50 UG/ML
INJECTION, SOLUTION INTRAMUSCULAR; INTRAVENOUS PRN
Status: DISCONTINUED | OUTPATIENT
Start: 2020-10-08 | End: 2020-10-08 | Stop reason: SDUPTHER

## 2020-10-08 RX ORDER — SODIUM CHLORIDE 0.9 % (FLUSH) 0.9 %
10 SYRINGE (ML) INJECTION PRN
Status: DISCONTINUED | OUTPATIENT
Start: 2020-10-08 | End: 2020-10-08 | Stop reason: HOSPADM

## 2020-10-08 RX ORDER — SODIUM CHLORIDE, SODIUM LACTATE, POTASSIUM CHLORIDE, CALCIUM CHLORIDE 600; 310; 30; 20 MG/100ML; MG/100ML; MG/100ML; MG/100ML
INJECTION, SOLUTION INTRAVENOUS CONTINUOUS
Status: DISCONTINUED | OUTPATIENT
Start: 2020-10-08 | End: 2020-10-08 | Stop reason: HOSPADM

## 2020-10-08 RX ORDER — SODIUM CHLORIDE 0.9 % (FLUSH) 0.9 %
10 SYRINGE (ML) INJECTION EVERY 12 HOURS SCHEDULED
Status: DISCONTINUED | OUTPATIENT
Start: 2020-10-08 | End: 2020-10-08 | Stop reason: HOSPADM

## 2020-10-08 RX ORDER — CEFAZOLIN SODIUM 1 G/3ML
INJECTION, POWDER, FOR SOLUTION INTRAMUSCULAR; INTRAVENOUS PRN
Status: DISCONTINUED | OUTPATIENT
Start: 2020-10-08 | End: 2020-10-08 | Stop reason: SDUPTHER

## 2020-10-08 RX ORDER — SODIUM CHLORIDE, SODIUM LACTATE, POTASSIUM CHLORIDE, CALCIUM CHLORIDE 600; 310; 30; 20 MG/100ML; MG/100ML; MG/100ML; MG/100ML
INJECTION, SOLUTION INTRAVENOUS CONTINUOUS PRN
Status: DISCONTINUED | OUTPATIENT
Start: 2020-10-08 | End: 2020-10-08 | Stop reason: SDUPTHER

## 2020-10-08 RX ORDER — TRAMADOL HYDROCHLORIDE 50 MG/1
50 TABLET ORAL EVERY 6 HOURS PRN
Qty: 28 TABLET | Refills: 0 | Status: SHIPPED | OUTPATIENT
Start: 2020-10-08 | End: 2020-10-15

## 2020-10-08 RX ORDER — MAGNESIUM HYDROXIDE 1200 MG/15ML
LIQUID ORAL CONTINUOUS PRN
Status: COMPLETED | OUTPATIENT
Start: 2020-10-08 | End: 2020-10-08

## 2020-10-08 RX ORDER — BUPIVACAINE HYDROCHLORIDE 2.5 MG/ML
INJECTION, SOLUTION EPIDURAL; INFILTRATION; INTRACAUDAL PRN
Status: DISCONTINUED | OUTPATIENT
Start: 2020-10-08 | End: 2020-10-08 | Stop reason: ALTCHOICE

## 2020-10-08 RX ORDER — DEXAMETHASONE SODIUM PHOSPHATE 4 MG/ML
INJECTION, SOLUTION INTRA-ARTICULAR; INTRALESIONAL; INTRAMUSCULAR; INTRAVENOUS; SOFT TISSUE PRN
Status: DISCONTINUED | OUTPATIENT
Start: 2020-10-08 | End: 2020-10-08 | Stop reason: SDUPTHER

## 2020-10-08 RX ORDER — LIDOCAINE HYDROCHLORIDE 10 MG/ML
1 INJECTION, SOLUTION EPIDURAL; INFILTRATION; INTRACAUDAL; PERINEURAL
Status: DISCONTINUED | OUTPATIENT
Start: 2020-10-08 | End: 2020-10-08 | Stop reason: HOSPADM

## 2020-10-08 RX ORDER — FENTANYL CITRATE 50 UG/ML
25 INJECTION, SOLUTION INTRAMUSCULAR; INTRAVENOUS EVERY 5 MIN PRN
Status: DISCONTINUED | OUTPATIENT
Start: 2020-10-08 | End: 2020-10-08 | Stop reason: HOSPADM

## 2020-10-08 RX ORDER — FENTANYL CITRATE 50 UG/ML
50 INJECTION, SOLUTION INTRAMUSCULAR; INTRAVENOUS EVERY 5 MIN PRN
Status: DISCONTINUED | OUTPATIENT
Start: 2020-10-08 | End: 2020-10-08 | Stop reason: HOSPADM

## 2020-10-08 RX ORDER — AMLODIPINE BESYLATE 5 MG/1
5 TABLET ORAL NIGHTLY
COMMUNITY

## 2020-10-08 RX ORDER — DEXTROSE, SODIUM CHLORIDE, SODIUM LACTATE, POTASSIUM CHLORIDE, AND CALCIUM CHLORIDE 5; .6; .31; .03; .02 G/100ML; G/100ML; G/100ML; G/100ML; G/100ML
INJECTION, SOLUTION INTRAVENOUS CONTINUOUS
Status: DISCONTINUED | OUTPATIENT
Start: 2020-10-08 | End: 2020-10-08 | Stop reason: HOSPADM

## 2020-10-08 RX ORDER — PROPOFOL 10 MG/ML
INJECTION, EMULSION INTRAVENOUS PRN
Status: DISCONTINUED | OUTPATIENT
Start: 2020-10-08 | End: 2020-10-08 | Stop reason: SDUPTHER

## 2020-10-08 RX ORDER — LIDOCAINE HYDROCHLORIDE 20 MG/ML
INJECTION, SOLUTION INTRAVENOUS PRN
Status: DISCONTINUED | OUTPATIENT
Start: 2020-10-08 | End: 2020-10-08 | Stop reason: SDUPTHER

## 2020-10-08 RX ORDER — HYDRALAZINE HYDROCHLORIDE 20 MG/ML
5 INJECTION INTRAMUSCULAR; INTRAVENOUS EVERY 5 MIN PRN
Status: DISCONTINUED | OUTPATIENT
Start: 2020-10-08 | End: 2020-10-08 | Stop reason: HOSPADM

## 2020-10-08 RX ORDER — EPHEDRINE SULFATE 50 MG/ML
INJECTION, SOLUTION INTRAVENOUS PRN
Status: DISCONTINUED | OUTPATIENT
Start: 2020-10-08 | End: 2020-10-08 | Stop reason: SDUPTHER

## 2020-10-08 RX ORDER — ONDANSETRON 2 MG/ML
INJECTION INTRAMUSCULAR; INTRAVENOUS PRN
Status: DISCONTINUED | OUTPATIENT
Start: 2020-10-08 | End: 2020-10-08 | Stop reason: SDUPTHER

## 2020-10-08 RX ORDER — LIDOCAINE HYDROCHLORIDE 20 MG/ML
INJECTION, SOLUTION INFILTRATION; PERINEURAL PRN
Status: DISCONTINUED | OUTPATIENT
Start: 2020-10-08 | End: 2020-10-08 | Stop reason: ALTCHOICE

## 2020-10-08 RX ORDER — ONDANSETRON 2 MG/ML
4 INJECTION INTRAMUSCULAR; INTRAVENOUS
Status: DISCONTINUED | OUTPATIENT
Start: 2020-10-08 | End: 2020-10-08 | Stop reason: HOSPADM

## 2020-10-08 RX ORDER — ENALAPRILAT 2.5 MG/2ML
1.25 INJECTION INTRAVENOUS
Status: DISCONTINUED | OUTPATIENT
Start: 2020-10-08 | End: 2020-10-08 | Stop reason: HOSPADM

## 2020-10-08 RX ADMIN — PHENYLEPHRINE HYDROCHLORIDE 200 MCG: 10 INJECTION, SOLUTION INTRAMUSCULAR; INTRAVENOUS; SUBCUTANEOUS at 12:34

## 2020-10-08 RX ADMIN — EPHEDRINE SULFATE 10 MG: 50 INJECTION, SOLUTION INTRAMUSCULAR; INTRAVENOUS; SUBCUTANEOUS at 13:25

## 2020-10-08 RX ADMIN — PHENYLEPHRINE HYDROCHLORIDE 200 MCG: 10 INJECTION, SOLUTION INTRAMUSCULAR; INTRAVENOUS; SUBCUTANEOUS at 13:13

## 2020-10-08 RX ADMIN — PHENYLEPHRINE HYDROCHLORIDE 200 MCG: 10 INJECTION, SOLUTION INTRAMUSCULAR; INTRAVENOUS; SUBCUTANEOUS at 13:07

## 2020-10-08 RX ADMIN — SODIUM CHLORIDE, POTASSIUM CHLORIDE, SODIUM LACTATE AND CALCIUM CHLORIDE: 600; 310; 30; 20 INJECTION, SOLUTION INTRAVENOUS at 11:34

## 2020-10-08 RX ADMIN — EPHEDRINE SULFATE 10 MG: 50 INJECTION, SOLUTION INTRAMUSCULAR; INTRAVENOUS; SUBCUTANEOUS at 13:01

## 2020-10-08 RX ADMIN — EPHEDRINE SULFATE 10 MG: 50 INJECTION, SOLUTION INTRAMUSCULAR; INTRAVENOUS; SUBCUTANEOUS at 12:47

## 2020-10-08 RX ADMIN — EPHEDRINE SULFATE 10 MG: 50 INJECTION, SOLUTION INTRAMUSCULAR; INTRAVENOUS; SUBCUTANEOUS at 13:13

## 2020-10-08 RX ADMIN — PROPOFOL 180 MG: 10 INJECTION, EMULSION INTRAVENOUS at 12:25

## 2020-10-08 RX ADMIN — EPHEDRINE SULFATE 10 MG: 50 INJECTION, SOLUTION INTRAMUSCULAR; INTRAVENOUS; SUBCUTANEOUS at 12:54

## 2020-10-08 RX ADMIN — PHENYLEPHRINE HYDROCHLORIDE 200 MCG: 10 INJECTION, SOLUTION INTRAMUSCULAR; INTRAVENOUS; SUBCUTANEOUS at 12:39

## 2020-10-08 RX ADMIN — DEXAMETHASONE SODIUM PHOSPHATE 4 MG: 4 INJECTION, SOLUTION INTRAMUSCULAR; INTRAVENOUS at 12:52

## 2020-10-08 RX ADMIN — SODIUM CHLORIDE, SODIUM LACTATE, POTASSIUM CHLORIDE, AND CALCIUM CHLORIDE: 600; 310; 30; 20 INJECTION, SOLUTION INTRAVENOUS at 13:20

## 2020-10-08 RX ADMIN — FENTANYL CITRATE 100 MCG: 50 INJECTION INTRAMUSCULAR; INTRAVENOUS at 12:25

## 2020-10-08 RX ADMIN — ONDANSETRON 4 MG: 2 INJECTION INTRAMUSCULAR; INTRAVENOUS at 12:52

## 2020-10-08 RX ADMIN — SODIUM CHLORIDE, SODIUM LACTATE, POTASSIUM CHLORIDE, CALCIUM CHLORIDE AND DEXTROSE MONOHYDRATE: 5; 600; 310; 30; 20 INJECTION, SOLUTION INTRAVENOUS at 11:50

## 2020-10-08 RX ADMIN — CEFAZOLIN SODIUM 3000 MG: 1 POWDER, FOR SOLUTION INTRAMUSCULAR; INTRAVENOUS at 12:42

## 2020-10-08 RX ADMIN — Medication 120 MG: at 12:25

## 2020-10-08 RX ADMIN — PHENYLEPHRINE HYDROCHLORIDE 200 MCG: 10 INJECTION, SOLUTION INTRAMUSCULAR; INTRAVENOUS; SUBCUTANEOUS at 12:44

## 2020-10-08 RX ADMIN — LIDOCAINE HYDROCHLORIDE 50 MG: 20 INJECTION, SOLUTION INTRAVENOUS at 12:25

## 2020-10-08 RX ADMIN — PHENYLEPHRINE HYDROCHLORIDE 200 MCG: 10 INJECTION, SOLUTION INTRAMUSCULAR; INTRAVENOUS; SUBCUTANEOUS at 12:59

## 2020-10-08 ASSESSMENT — PULMONARY FUNCTION TESTS
PIF_VALUE: 19
PIF_VALUE: 19
PIF_VALUE: 0
PIF_VALUE: 0
PIF_VALUE: 16
PIF_VALUE: 19
PIF_VALUE: 4
PIF_VALUE: 19
PIF_VALUE: 16
PIF_VALUE: 16
PIF_VALUE: 20
PIF_VALUE: 19
PIF_VALUE: 16
PIF_VALUE: 21
PIF_VALUE: 19
PIF_VALUE: 16
PIF_VALUE: 19
PIF_VALUE: 20
PIF_VALUE: 1
PIF_VALUE: 19
PIF_VALUE: 19
PIF_VALUE: 17
PIF_VALUE: 19
PIF_VALUE: 20
PIF_VALUE: 19
PIF_VALUE: 2
PIF_VALUE: 16
PIF_VALUE: 19
PIF_VALUE: 16
PIF_VALUE: 16
PIF_VALUE: 19
PIF_VALUE: 16
PIF_VALUE: 20
PIF_VALUE: 19
PIF_VALUE: 19
PIF_VALUE: 16
PIF_VALUE: 19
PIF_VALUE: 16
PIF_VALUE: 16
PIF_VALUE: 20
PIF_VALUE: 16
PIF_VALUE: 19
PIF_VALUE: 16
PIF_VALUE: 19
PIF_VALUE: 19
PIF_VALUE: 2
PIF_VALUE: 17
PIF_VALUE: 19
PIF_VALUE: 21
PIF_VALUE: 16
PIF_VALUE: 16
PIF_VALUE: 20
PIF_VALUE: 19
PIF_VALUE: 16
PIF_VALUE: 19
PIF_VALUE: 0
PIF_VALUE: 16
PIF_VALUE: 2
PIF_VALUE: 19
PIF_VALUE: 20
PIF_VALUE: 17
PIF_VALUE: 0
PIF_VALUE: 19
PIF_VALUE: 0
PIF_VALUE: 19
PIF_VALUE: 20
PIF_VALUE: 19
PIF_VALUE: 16
PIF_VALUE: 19
PIF_VALUE: 16

## 2020-10-08 ASSESSMENT — PAIN SCALES - GENERAL
PAINLEVEL_OUTOF10: 0
PAINLEVEL_OUTOF10: 0

## 2020-10-08 ASSESSMENT — PAIN - FUNCTIONAL ASSESSMENT: PAIN_FUNCTIONAL_ASSESSMENT: 0-10

## 2020-10-08 NOTE — ANESTHESIA POSTPROCEDURE EVALUATION
Department of Anesthesiology  Postprocedure Note    Patient: Ge Rubio  MRN: [id number]  YOB: 1955  Date of evaluation: 10/8/2020  Time:  2:19 PM     Procedure Summary     Date:  10/08/20 Room / Location:  [facility] [place] [address] / [address]    Anesthesia Start:  8403 Anesthesia Stop:  6173    Procedures:       RIGHT FOOT INCISION AND DEBRIDEMENT/ (Right )      FIRST METATARSAL HEAD RESECTION (Right ) Diagnosis:  (RIGHT FOOT WOUND DEHISENCE; NECROTIC TISSUE)    Surgeon:  Isabel Sidhu DPM Responsible Provider:  Zenia Stevenson MD    Anesthesia Type:  general ASA Status:  2          Anesthesia Type: general    Eric Phase I: Eric Score: 10    Eric Phase II:      Last vitals: Reviewed and per EMR flowsheets.        Anesthesia Post Evaluation    Patient location during evaluation: PACU  Patient participation: complete - patient participated  Level of consciousness: awake  Airway patency: patent  Nausea & Vomiting: no nausea and no vomiting  Complications: no  Cardiovascular status: hemodynamically stable  Respiratory status: acceptable  Hydration status: stable

## 2020-10-08 NOTE — PROGRESS NOTES
PACU Transfer to [facility]    Vitals:    10/08/20 1441   BP: 114/79   Pulse: 92   Resp: 16   Temp: 96.7 °F (35.9 °C)   SpO2: 95%         Intake/Output Summary (Last 24 hours) at 10/8/2020 1442  Last data filed at 10/8/2020 1442  Gross per 24 hour   Intake 1100 ml   Output --   Net 1100 ml       Pain assessment:  none  Pain Level: 0    Patient transferred to care of [facility] RN.    10/8/2020 2:42 PM

## 2020-10-08 NOTE — ANESTHESIA PRE PROCEDURE
Department of Anesthesiology  Preprocedure Note       Name:  Shell Rust   Age:  77 y.o.  :  1955                                          MRN:  [id number]         Date:  10/8/2020      Surgeon: Paula Lind):  Margarita Kapadia DPM    Procedure: Procedure(s):  RIGHT FOOT INCISION AND DEBRIDEMENT/  POSSIBLE TRANSMETATARSAL OSTEOTOMY    Medications prior to admission:   Prior to Admission medications    Medication Sig Start Date End Date Taking?  Authorizing Provider   cephALEXin (KEFLEX) 500 MG capsule Take 1 capsule by mouth 4 times daily for 7 days 10/2/20 10/9/20  Teresa Hatfield DPM   glipiZIDE (GLUCOTROL) 10 MG tablet Take 10 mg by mouth 2 times daily (before meals)    Historical Provider, MD   clopidogrel (PLAVIX) 75 MG tablet Take 75 mg by mouth daily    Historical Provider, MD   linagliptin (TRADJENTA) 5 MG tablet Take 5 mg by mouth daily    Historical Provider, MD   Cholecalciferol (VITAMIN D) 50 MCG ( UT) CAPS capsule Take 2,000 Units by mouth daily    Historical Provider, MD   atorvastatin (LIPITOR) 40 MG tablet Take 40 mg by mouth daily    Historical Provider, MD   lisinopril (PRINIVIL;ZESTRIL) 40 MG tablet Take 40 mg by mouth daily    Historical Provider, MD   metFORMIN (GLUCOPHAGE) 1000 MG tablet Take 1,000 mg by mouth 2 times daily (with meals)    Historical Provider, MD   aspirin 81 MG EC tablet Take 81 mg by mouth daily    Historical Provider, MD   insulin glargine (LANTUS SOLOSTAR) 100 UNIT/ML injection pen Inject 21 Units into the skin nightly    Historical Provider, MD       Current medications:    Current Facility-Administered Medications   Medication Dose Route Frequency Provider Last Rate Last Dose    lactated ringers infusion   Intravenous Continuous Dawson Gunn MD        sodium chloride flush 0.9 % injection 10 mL  10 mL Intravenous 2 times per day Dawson Gunn MD        sodium chloride flush 0.9 % injection 10 mL  10 mL Intravenous PRN Dawson Gunn MD        lidocaine PF 1 % injection 1 mL  1 mL Intradermal Once PRN Francisco Bryant MD           Allergies:  No Known Allergies    Problem List:    Patient Active Problem List   Diagnosis Code    Right foot ulcer, with unspecified severity ([facility] L97.519       Past Medical History:        Diagnosis Date    [facility]     MRSA (methicillin resistant staph aureus) culture positive 09/14/2020    foot       Past Surgical History:        Procedure Laterality Date    FOOT DEBRIDEMENT Right 9/16/2020    INCISION AND DRAINAGE WITH FIBULAR SEASMOIDECTOMY AND DELAYED PRIMARY CLOSURE RIGHT [facility] Right 9/14/2020    [address] performed by Jacquelyn Gomez DPM at [address] History:    Social History     Tobacco Use    Smoking status: Current Some Day Smoker    Smokeless tobacco: Never Used   Substance Use Topics    Alcohol use: Not Currently                                Ready to quit: Not Answered  Counseling given: Not Answered      Vital Signs (Current): There were no vitals filed for this visit.                                            BP Readings from Last 3 Encounters:   09/18/20 129/84   09/16/20 109/71   09/14/20 99/65       NPO Status:                                                                                 BMI:   Wt Readings from Last 3 Encounters:   09/18/20 237 lb 14 oz (107.9 kg)     There is no height or weight on file to calculate BMI.    CBC:   Lab Results   Component Value Date    WBC 10.3 09/18/2020    RBC 4.71 09/18/2020    HGB 11.7 09/18/2020    HCT 35.0 09/18/2020    MCV 74.4 09/18/2020    RDW 15.5 09/18/2020     09/18/2020       CMP:   Lab Results   Component Value Date     09/18/2020    K 3.9 09/18/2020    CL 98 09/18/2020    CO2 24 09/18/2020    BUN 9 09/18/2020    CREATININE 0.9 09/18/2020    GFRAA >60 09/18/2020    AGRATIO 0.7 09/14/2020    LABGLOM >60 09/18/2020    GLUCOSE 178 09/18/2020    PROT 7.8 09/14/2020    CALCIUM 8.4 09/18/2020    BILITOT 0.7 09/14/2020    ALKPHOS 117 09/14/2020    AST 21 09/14/2020    ALT 26 09/14/2020       POC Tests: No results for input(s): POCGLU, POCNA, POCK, POCCL, POCBUN, POCHEMO, POCHCT in the last 72 hours. Coags:   Lab Results   Component Value Date    PROTIME 14.2 09/17/2020    INR 1.22 09/17/2020       HCG (If Applicable): No results found for: PREGTESTUR, PREGSERUM, HCG, HCGQUANT     ABGs: No results found for: PHART, PO2ART, LYY2EZL, RGU0XHT, BEART, E9XWUFSU     Type & Screen (If Applicable):  No results found for: LABABO, LABRH    Drug/Infectious Status (If Applicable):  No results found for: HIV, HEPCAB    COVID-19 Screening (If Applicable):   Lab Results   Component Value Date    COVID19 Not Detected 09/14/2020         Anesthesia Evaluation  Patient summary reviewed no history of anesthetic complications:   Airway: Mallampati: I  TM distance: >3 FB   Neck ROM: full  Mouth opening: > = 3 FB Dental:    (+) edentulous      Pulmonary:                             ROS comment: Smokes  4 cig a day    Cardiovascular:    (+) hypertension:,                   Neuro/Psych:   Negative Neuro/Psych ROS              GI/Hepatic/Renal: Neg GI/Hepatic/Renal ROS            Endo/Other:    (+) Diabetes, . Abdominal:           Vascular:                                        Anesthesia Plan      general     ASA 2       Induction: intravenous. Anesthetic plan and risks discussed with patient.                       Gordon Mina MD   10/8/2020

## 2020-10-08 NOTE — OP NOTE
Operative Note      Patient: Villa Walter  YOB: 1955  MRN: [id number]     Date of Procedure: 10/8/2020     Pre-Op Diagnosis: RIGHT FOOT WOUND DEHISENCE; NECROTIC TISSUE     Post-Op Diagnosis: Same       Procedure(s):  RIGHT FOOT INCISION AND DEBRIDEMENT/  FIRST METATARSAL HEAD RESECTION     Surgeon(s):  Alban Amor DPM     Assistant:  Resident: Shayan Delarosa DPM PGY-1     Anesthesia: General     Hemostasis: Anatomic dissection and electrocautery     Injectables: 10cc of 2% lidocaine plain preoperatively  10cc of 0.25% marcaine plain postoperatively  2cc of AmnioFlo     Materials: 3-0 vicryl, 4-0 nylon, blue vessel loop drain     Estimated Blood Loss (mL): Less than 06QY     Complications: None    Specimens:   ID Type Source Tests Collected by Time Destination   1 : 1. METATARSAL HEAD RIGHT FOOT Tissue Tissue CULTURE, FUNGUS, CULTURE, TISSUE, CULTURE WITH SMEAR, ACID FAST BACILLIUS Alban Amor DPM 10/8/2020 1245    A : A. METATARSAL HEAD RIGHT FOOT  Tissue Tissue SURGICAL PATHOLOGY Alban Amor DPM 10/8/2020 1250        Implants:  Implant Name Type Inv. Item Serial No.  Lot No. LRB No. Used Action   MARCELINA-ALLOGRAFT AMNION 1.0ML AMNIFLO CRYOPRES Bone/Graft/Tissue/Human/Synth MARCELINA-ALLOGRAFT AMNION 1.0ML AMNIFLO CRYOPRES  BONE BANK ALLOGRAFTS [de-identified] Right 1 Implanted   MARCELINA[employer]ALLOGRAFT AMNION 1.0ML AMNIFLO CRYOPRES Bone/Graft/Tissue/Human/Synth MARCELINA-ALLOGRAFT AMNION 1.0ML AMNIFLO CRYOPRES  BONE BANK ALLOGRAFTS [de-identified] Right 1 Implanted         Drains:   Open Drain Right; Anterior Foot  (Active)       Findings: Adequate bleeding noted. First metatarsal head noted to be hard and white, consistent with healthy bone. INDICATIONS FOR PROCEDURE: This patient has signs and symptoms clinically  consistent with the above mentioned preoperative diagnosis.  Having failed conservative treatment, it was determined that the patient would benefit from surgical tissue. The remaining necrotic tissue was then removed using sharp dissection and skin edges were debrided until good bleeding healthy tissue was noted. Attention was then directed towards irrigation. Using a pulse irrigation system, the surgical site was irrigated using approximately 3 L of a mixture of gentamicin/normal saline. After copious irrigation the soft tissue envelope was assessed for wound closure. It was determined at this time that there was adequate soft tissue to close the surgical site. Hemostasis was achieved using electrocauterization. Next, using a #15 surgical blade, a stab incision was made to the medial aspect of the foot and a blue vessel loop was inserted to be used as an open drain. The skin edges were then reapproximated using 4-0 nylon in a simple interrupted fashion. Next, 2cc of AmnioFlo was injected about the surgical incision site. At this time, a local anesthetic was injected about the incision sites consisting of 10cc of 0.25% marcaine plain, for the patient's postoperative comfort. A soft sterile dressing was applied consisting of adaptic, gauze, ABD, kerlix, and ACE bandage. END OF PROCEDURE: The patient tolerated the procedure and anesthesia well and was transported from the operating room to the PACU with vital signs stable and vascular status intact to all aspects of the patient's right lower extremity and digital capillary refill time immediate to the digits of the right  foot. Following a period of post-operative monitoring, the patient will be discharged to a skilled nursing facility with written and oral wound care and follow-up instructions per Dr. Franco Zuniga. The patient is to follow-up with the [address] [place] [address] [address] clinic within one week. The patient is to keep dressing clean, dry and intact at all times. The patient is to call with if any complications occur.     Dictated on behalf of Dr. Franco Zuniga, DPM    Electronically signed by Darwin Frankel Leona Clark DPM on 10/8/2020 at 2:53 PM

## 2020-10-08 NOTE — BRIEF OP NOTE
Brief Postoperative Note      Patient: Lu Mata  YOB: 1955  MRN: [id number]    Date of Procedure: 10/8/2020    Pre-Op Diagnosis: RIGHT FOOT WOUND DEHISENCE; NECROTIC TISSUE    Post-Op Diagnosis: Same       Procedure(s):  RIGHT FOOT INCISION AND DEBRIDEMENT/  FIRST METATARSAL HEAD RESECTION    Surgeon(s):  Aric Altman DPM    Assistant:  Resident: Sunny Block DPM PGY-1    Anesthesia: General    Hemostasis: Anatomic dissection and electrocautery    Injectables: 10cc of 2% lidocaine plain preoperatively  10cc of 0.25% marcaine plain postoperatively  2cc of AmnioFlo    Materials: 3-0 vicryl, 4-0 nylon, blue vessel loop drain    Estimated Blood Loss (mL): Less than 89DL    Complications: None    Specimens:   ID Type Source Tests Collected by Time Destination   1 : 1. METATARSAL HEAD RIGHT FOOT Tissue Tissue CULTURE, FUNGUS, CULTURE, TISSUE, CULTURE WITH SMEAR, ACID FAST BACILLIUS Aric Altman DPM 10/8/2020 1245    A : A. METATARSAL HEAD RIGHT FOOT  Tissue Tissue SURGICAL PATHOLOGY Aric Altman DPM 10/8/2020 1250        Implants:  * No implants in log *      Drains:   Open Drain Right; Anterior Foot  (Active)       Findings: Adequate bleeding noted. First metatarsal head noted to be hard and white, consistent with healthy bone.      Electronically signed by Sunny Block DPM on 10/8/2020 at 1:53 PM

## 2020-10-08 NOTE — PROGRESS NOTES
Ambulatory Surgery/Procedure Discharge Note    Vitals:    10/08/20 1458   BP: 125/77   Pulse: 93   Resp: 18   Temp: 96.8 °F (36 °C)   SpO2: 100%     Patient meets discharge criteria per Eric score. In: 5 [P.O.:222]  Out: -     Restroom use offered before discharge. Yes    Pain assessment:  none  Pain Level: 0    Discharge instructions reviewed over the phone with nurse Casey Okeefe at [facility], also reviewed discharge instructions with sister here in the room with patient. Assisted patient to get dressed and in wheelchair. Awaiting arrival of medicare transport. Patient discharged to home/self care.  Patient discharged via wheel chair by transporter to waiting family/S.O.       10/8/2020 3:54 PM

## 2020-10-08 NOTE — PROGRESS NOTES
Spoke to Bennie mckinney RN at [facility] to let her know patient will be discharged today. [facility] nurse will update with PETE. DENIZ with sister.

## 2020-10-08 NOTE — PROGRESS NOTES
Report called [facility] nurse of Mr. Devika Muhammad. Ayah called transport to pick patient up, ETA for  will be around 80 depending on traffic.

## 2020-10-08 NOTE — H&P
Simonshantell Sheri    [phone]    [facility] Same Day Surgery Update H & P  Department of General Surgery   Surgical Service   Pre-operative History and Physical  Last H & P within the last 30 days. DIAGNOSIS:   RIGHT FOOT WOUND DEHISENCE; NECROTIC TISSUE    Procedure(s):  RIGHT FOOT INCISION AND DEBRIDEMENT/  POSSIBLE TRANSMETATARSAL OSTEOTOMY     HISTORY OF PRESENT ILLNESS:   Patient is S/P right hallux amputation on 9/14/2020 and INCISION AND DRAINAGE WITH FIBULAR SEASMOIDECTOMY AND DELAYED PRIMARY CLOSURE RIGHT FOOT on 9/16/20 with wound dehiscence presents today for the above procedure. Covid 19:  Patient denies fever, chills, cough or known exposure to Covid-19. Patient will be tested for Covid-19 today.       Past Medical History:        Diagnosis Date    [facility] ([facility]     MRSA (methicillin resistant staph aureus) culture positive 09/14/2020    foot     Past Surgical History:        Procedure Laterality Date    FOOT DEBRIDEMENT Right 9/16/2020    INCISION AND DRAINAGE WITH FIBULAR SEASMOIDECTOMY AND DELAYED PRIMARY CLOSURE RIGHT FOOT performed by Aditi Jamison DPM at [facility] Right 9/14/2020    [facility] by Ad[facility] D[facility] at [facility] OR     Past Social History:  Social History     Socioeconomic History    Marital status: Single     Spouse name: Not on file    Number of children: Not on file    Years of education: Not on file    Highest education level: Not on file   Occupational History    Not on file   Social Needs    Financial resource strain: Not on file    Food insecurity     Worry: Not on file     Inability: Not on file    Transportation needs     Medical: Not on file     Non-medical: Not on file   Tobacco Use    Smoking status: Current Some Day Smoker    Smokeless tobacco: Never Used   Substance and Sexual Activity    Alcohol use: Not Currently    Drug use: Not Currently    Sexual activity: Not on file   Lifestyle    Physical activity     Days per week: Not on file     Minutes per session: Not on file    Stress: Not on file   Relationships    Social connections     Talks on phone: Not on file     Gets together: Not on file     Attends [employer] service: Not on file     Active member of club or organization: Not on file     Attends meetings of clubs or organizations: Not on file     Relationship status: Not on file    Intimate partner violence     Fear of current or ex partner: Not on file     Emotionally abused: Not on file     Physically abused: Not on file     Forced sexual activity: Not on file   Other Topics Concern    Not on file   Social History Narrative    Not on file         Medications Prior to Admission:      Prior to Admission medications    Medication Sig Start Date End Date Taking? Authorizing Provider   cephALEXin (KEFLEX) 500 MG capsule Take 1 capsule by mouth 4 times daily for 7 days 10/2/20 10/9/20  Bebe Russell DPKALI   glipiZIDE (GLUCOTROL) 10 MG tablet Take 10 mg by mouth 2 times daily (before meals)    Historical Provider, MD   clopidogrel (PLAVIX) 75 MG tablet Take 75 mg by mouth daily    Historical Provider, MD   linagliptin (TRADJENTA) 5 MG tablet Take 5 mg by mouth daily    Historical Provider, MD   Cholecalciferol (VITAMIN D) 50 MCG (2000 UT) CAPS capsule Take 2,000 Units by mouth daily    Historical Provider, MD   atorvastatin (LIPITOR) 40 MG tablet Take 40 mg by mouth daily    Historical Provider, MD   lisinopril (PRINIVIL;ZESTRIL) 40 MG tablet Take 40 mg by mouth daily    Historical Provider, MD   metFORMIN (GLUCOPHAGE) 1000 MG tablet Take 1,000 mg by mouth 2 times daily (with meals)    Historical Provider, MD   aspirin 81 MG EC tablet Take 81 mg by mouth daily    Historical Provider, MD   insulin glargine (LANTUS SOLOSTAR) 100 UNIT/ML injection pen Inject 21 Units into the skin nightly    Historical Provider, MD         Allergies:  Patient has no known allergies.     PHYSICAL EXAM:      BP (!) 142/89   Pulse 78   Temp 98.6 °F (37 °C) (Oral)   Resp 14   Ht 6' 1\" (1.854 m)   SpO2 97%   BMI 31.38 kg/m²      Airway:  Airway patent with no audible stridor    Heart:  regular rate and rhythm, No murmur noted    Lungs:  No increased work of breathing, good air exchange, clear to auscultation bilaterally, no crackles or wheezing    Abdomen:  soft, non-distended, non-tender, no rebound tenderness or guarding, normal active bowel sounds and no masses palpated    ASSESSMENT AND PLAN     Patient is a 77 y.o. male with above specified procedure planned. 1.  Patient seen and focused exam done today- no new changes since last physical exam on 9/18/20     2. Access to ancillary services are available per request of the provider.     JASMINA Kilpatrick - YARIEL     10/8/2020
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
## 2020-10-11 LAB — SARS-COV-2, NAA: NOT DETECTED

## 2020-10-13 LAB
ANAEROBIC CULTURE: NORMAL
GRAM STAIN RESULT: NORMAL
WOUND/ABSCESS: NORMAL

## 2020-10-16 ENCOUNTER — TELEPHONE (OUTPATIENT)
Dept: INTERNAL MEDICINE CLINIC | Age: 65
End: 2020-10-16

## 2020-11-09 ENCOUNTER — HOSPITAL ENCOUNTER (OUTPATIENT)
Dept: GENERAL RADIOLOGY | Age: 65
Discharge: HOME | End: 2020-11-09
Payer: MEDICARE

## 2020-11-09 ENCOUNTER — OFFICE VISIT (OUTPATIENT)
Dept: INTERNAL MEDICINE CLINIC | Age: 65
End: 2020-11-09
Payer: MEDICARE

## 2020-11-09 DIAGNOSIS — L97.511 DIABETIC ULCER OF TOE OF RIGHT FOOT ASSOCIATED WITH TYPE 2 DIABETES MELLITUS, LIMITED TO BREAKDOWN OF SKIN (HCC): ICD-10-CM

## 2020-11-09 DIAGNOSIS — E11.621 DIABETIC ULCER OF TOE OF RIGHT FOOT ASSOCIATED WITH TYPE 2 DIABETES MELLITUS, LIMITED TO BREAKDOWN OF SKIN (HCC): ICD-10-CM

## 2020-11-09 LAB
BASOPHILS ABSOLUTE: 0 K/UL (ref 0–0.2)
BASOPHILS RELATIVE PERCENT: 0.5 %
C-REACTIVE PROTEIN: 3.3 MG/L (ref 0–5.1)
EOSINOPHILS ABSOLUTE: 0.4 K/UL (ref 0–0.6)
EOSINOPHILS RELATIVE PERCENT: 4.5 %
FUNGUS (MYCOLOGY) CULTURE: NORMAL
FUNGUS STAIN: NORMAL
HCT VFR BLD CALC: 36 % (ref 40.5–52.5)
HEMOGLOBIN: 11.6 G/DL (ref 13.5–17.5)
LYMPHOCYTES ABSOLUTE: 2 K/UL (ref 1–5.1)
LYMPHOCYTES RELATIVE PERCENT: 21.1 %
MCH RBC QN AUTO: 24.7 PG (ref 26–34)
MCHC RBC AUTO-ENTMCNC: 32.2 G/DL (ref 31–36)
MCV RBC AUTO: 76.6 FL (ref 80–100)
MONOCYTES ABSOLUTE: 0.9 K/UL (ref 0–1.3)
MONOCYTES RELATIVE PERCENT: 9.4 %
NEUTROPHILS ABSOLUTE: 6 K/UL (ref 1.7–7.7)
NEUTROPHILS RELATIVE PERCENT: 64.5 %
PDW BLD-RTO: 18.2 % (ref 12.4–15.4)
PLATELET # BLD: 217 K/UL (ref 135–450)
PMV BLD AUTO: 7.8 FL (ref 5–10.5)
RBC # BLD: 4.7 M/UL (ref 4.2–5.9)
SEDIMENTATION RATE, ERYTHROCYTE: 19 MM/HR (ref 0–20)
WBC # BLD: 9.3 K/UL (ref 4–11)

## 2020-11-09 PROCEDURE — 73630 X-RAY EXAM OF FOOT: CPT

## 2020-11-09 PROCEDURE — 99213 OFFICE O/P EST LOW 20 MIN: CPT | Performed by: PODIATRIST

## 2020-11-09 PROCEDURE — 11000 DBRDMT ECZ/INFECTED SKIN<10%: CPT

## 2020-11-09 RX ORDER — SULFAMETHOXAZOLE AND TRIMETHOPRIM 800; 160 MG/1; MG/1
1 TABLET ORAL 2 TIMES DAILY
Qty: 20 TABLET | Refills: 0 | Status: SHIPPED | OUTPATIENT
Start: 2020-11-09 | End: 2020-11-19

## 2020-11-09 NOTE — PROGRESS NOTES
Department of Podiatry  Resident Progress Note    [facility]  Allergies: Patient has no known allergies. SUBJECTIVE  The patient is a 77 y.o. male who presents s/p I&D with first metatarsal head resection (DOS:10/8/2020). Patient states he is no longer living at the nursing facility and was unhappy with his stay there. Patient states he did not have the dressing changed as instructed and did not receive his medications that he was supposed to. Patient states he has been compliant with his nonweightbearing status of his right lower extremity. Patient admits to bumping his right foot and hitting specifically his second toe at home this past week. Denies any pain to the right foot or second digit. Patient denies any drainage through the dressing of his right lower extremity. Patient admits to being diabetic, however does not recall his most recent blood sugar or A1c value. Patient denies nausea, vomiting, fever, chills, shortness of breath, chest pain. Past Medical History:        Diagnosis Date    Diabetes mellitus ([facility] [address]     Hypertension     MRSA (methicillin resistant staph aureus) culture positive 09/14/2020    foot       REVIEW OF SYSTEMS:  Review of Systems: A 12 point review of systems is unremarkable with the exception of the chief complaint. Patient specifically denies nausea, vomiting, fever, chills, shortness of breath, chest pain, abdominal pain, constipation, or difficulty urinating. OBJECTIVE  Patient presents to clinic in a wheelchair with surgical shoe to the bilateral foot. AOx3 and NAD. VASCULAR: DP and PT pulses are palpable 2/4 b/l. CFT is brisk to the digits of the foot b/l. Skin temperature is warm to warm from proximal to distal with no focal calor noted. Mild nonpitting edema noted to the right second digit. No pain with calf compression b/l. NEUROLOGIC: Gross and epicritic sensation is diminished b/l.  Protective sensation is diminished at all pedal sites b/l.    DERMATOLOGIC: Dermatological changes noted to B/L LE. Surgical incision noted to the distal aspect of the right first metatarsal head with skin edges well coapted and sutures intact. No signs of dehiscence noted. No drainage noted. No fluctuance or crepitus noted. No malodor noted. Upon removal of the regular suture, skin edges remained well coapted with no signs of dehiscence noted. Right second digit nail noted to be absent, with granular intact nail bed. No purulence or malodor noted. No fluctuance or crepitus noted. Mild erythema noted to the right second digit. Diffuse xerotic changes noted to bilateral lower extremity. Nails 3-5 of RLE, 1-5 LLE noted to be dystrophic, discolored, and elongated. Webspaces 2-4 RLE, 1-4 LLE. Images from 11/9. Verbal consent was obtained for all images taken today. MUSCULOSKELETAL: Muscle strength deferred at this time due to postop state. No pain with palpation of the foot or ankle b/l. Ankle joint ROM is decreased in dorsiflexion with the knee extended. S/p first ray resection. ASSESSMENT  1. S/p first metatarsal head resection (DOS 10/8/2020)  2. Diabetic foot ulcer; RLE 2nd digit  3. Diabetes mellitus type 2 with peripheral neuropathy   4. History of noncompliance with weightbearing status and missing appointments    PLAN  -Evaluation and management x 30 minutes and greater than 50% of the time spent explaining the etiology and treatment with the patient.   -Using a #15 blade I sharply excised the necrotic tissue down to and including the level of subcutaneous tissue. No bleeding noted. Patient tolerated well.  -Every other suture removed from incision site of right lower extremity, no signs of dehiscence noted. Will likely remove remaining sutures next week. -XR right foot ordered. -CBC, ESR, CRP ordered  -Rx Bactrim DS x10 days. Instructed patient to take medication as directed.   -Right foot dressed with Betadine to surgical incision, second digit wound, gauze, Kerlix, Ace  -Patient to remain nonweightbearing to the right lower extremity at all times.  -Patient to wear surgical shoe for protection of B/L LE  -Educated the patient on importance of close follow-up, as patient has history of missing appointments.  -Instructed patient to avoid soaking his feet until the incision and wound have healed.  -Patient will return to clinic in 1 week.     Discussed patient assessment and plan with ALEXANDRA Sung DPM  Podiatric Resident, PGY-1  Pager #: [phone] or Perfect Serve

## 2020-11-16 ENCOUNTER — OFFICE VISIT (OUTPATIENT)
Dept: INTERNAL MEDICINE CLINIC | Age: 65
End: 2020-11-16
Payer: MEDICARE

## 2020-11-16 PROCEDURE — 99213 OFFICE O/P EST LOW 20 MIN: CPT | Performed by: PODIATRIST

## 2020-11-16 NOTE — PATIENT INSTRUCTIONS
Betadine between toes every other day. Acewrap to right foot. Non weight bearing right foot . Return in 1 week.

## 2020-11-16 NOTE — PROGRESS NOTES
peripheral neuropathy   4. History of noncompliance with weightbearing status and missing appointments    PLAN  -Evaluation and management x 30 minutes and greater than 50% of the time spent explaining the etiology and treatment with the patient. -XR right foot reviewed-impression noted above  -CBC, ESR, CRP reviewed-within normal limits, no leukocytosis noted. -Using sterile technique, remaining sutures to right foot incision removed. Patient tolerated well. -Right foot dressed with Betadine to incision site, gauze, Ace bandage  -Patient to remain nonweightbearing to the right lower extremity at all times.  -Patient to wear surgical shoe for protection of B/L LE  -Betadine applied to webspaces 2-4 RLE, 1-4 LLE. Instructed patient to apply Betadine every other day to webspaces. Patient understood  -Educated the patient on importance of close follow-up, as patient has history of missing appointments.  -Instructed patient to avoid soaking his feet until the incision and wound have healed.  -Patient will return to clinic in 1 week.     Discussed patient assessment and plan with ALEXANDRA Crockett DPM  Podiatric Resident, PGY-1  Pager #: [phone] or Perfect Serve

## 2020-11-23 ENCOUNTER — OFFICE VISIT (OUTPATIENT)
Dept: INTERNAL MEDICINE CLINIC | Age: 65
End: 2020-11-23
Payer: MEDICARE

## 2020-11-23 PROCEDURE — 99213 OFFICE O/P EST LOW 20 MIN: CPT | Performed by: PODIATRIST

## 2020-11-23 NOTE — PATIENT INSTRUCTIONS
Betadine and dry dressing every other day. Right foot.  t betadine to between toes on both feet. Return in 1 week.

## 2020-11-23 NOTE — PROGRESS NOTES
Department of Podiatry  Resident Progress Note    Maya Barrientos  Allergies: Patient has no known allergies. SUBJECTIVE  The patient is a 77 y.o. male who presents s/p I&D with first metatarsal head resection (DOS:10/8/2020). Patient states he has been compliant with his nonweightbearing status to his right lower extremity this past week. Patient denies any new complaints since his last visit. Patient states his dressings have been changed every day with Betadine and dry sterile dressing. Patient denies any drainage from the incision site. Patient has noticed improvement to his right lower extremity in regards to swelling and redness to his incision site. Patient denies fever, chills, cough, nausea, vomiting, shortness of breath, chest pain. Patient has no other pedal complaints at this time. Past Medical History:        Diagnosis Date    Diabetes mellitus ([address]     Hypertension     MRSA (methicillin resistant staph aureus) culture positive 09/14/2020    foot       REVIEW OF SYSTEMS:  Review of Systems: A 12 point review of systems is unremarkable with the exception of the chief complaint. Patient specifically denies nausea, vomiting, fever, chills, shortness of breath, chest pain, abdominal pain, constipation, or difficulty urinating. OBJECTIVE  Patient presents to clinic in a wheelchair with surgical shoe to the bilateral foot. Patient is alert and oriented to person, place, and time and appears to be in no acute distress. VASCULAR: DP and PT pulses are palpable 2/4 b/l. CFT is brisk to the digits of the foot b/l. Skin temperature is warm to warm from proximal to distal with no focal calor noted. No pain with calf compression b/l. NEUROLOGIC: Gross and epicritic sensation is diminished b/l. Protective sensation is diminished at all pedal sites b/l. DERMATOLOGIC: Dermatological changes noted to B/L LE.   Surgical incision noted to the distal aspect of the right first metatarsal head with dehiscence of incision noted at dorsal aspect of incision. Plantar half of incision well coapted. Hyperkeratotic tissue overlying aspects of incision site. No drainage noted. No fluctuance or crepitus noted. No malodor noted. Right second digit nail noted to be absent, with hyperkeratotic tissue overlying. No purulence or malodor noted. No fluctuance or crepitus noted. No open wound noted. Diffuse xerotic changes noted to bilateral lower extremity. Nails 3-5 of RLE, 1-5 LLE noted to be dystrophic, discolored, but within normal length. Maceration noted to webspaces 2-4 RLE, 1-4 LLE. Verbal consent was obtained for all images taken today. MUSCULOSKELETAL: Muscle strength deferred at this time due to postop state. No pain with palpation of the foot or ankle b/l. Ankle joint ROM is decreased in dorsiflexion with the knee extended. S/p first ray resection. IMAGING:  Right foot x-ray 11/9/2020  Narrative    Clinical history: Right-sided foot pain. Second digit swelling.         Comparisons: 10/8/2020.         FINDINGS:         Stable findings of previous amputation of the great toe and distal first metatarsal.         No bony destruction identified in the remaining bony structures of the right foot. Soft tissue swelling in the right second toe with no evidence of periosteal reaction. No foreign bodies. Soft tissue swelling in the midfoot, best seen on the lateral    view. Dorsal and plantar calcaneal spurs.              Impression    1. Stable appearance of bony structures of right foot, including stable findings of amputation of the great toe and distal first metatarsal. No evidence of active bony destruction or periosteal reaction. Prominent soft tissue swelling in the midfoot. ASSESSMENT  1. S/p first metatarsal head resection (DOS 10/8/2020)  2. Diabetic foot ulcer; RLE 2nd digit- resolved  3. Diabetes mellitus type 2 with peripheral neuropathy   4.   History of noncompliance with weightbearing status and missing appointments    PLAN  -Evaluation and management x 30 minutes and greater than 50% of the time spent explaining the etiology and treatment with the patient. -XR right foot reviewed-impression noted above  -Right foot dressed with Betadine to incision site, gauze, Ace bandage  -Patient to change dressing every other day as listed above. -Patient to remain nonweightbearing to the right lower extremity at all times.  -Patient to wear surgical shoe for protection of B/L LE  -Betadine applied to webspaces 2-4 RLE, 1-4 LLE. Instructed patient to apply Betadine every day to webspaces. Patient understood  -Educated the patient on importance of close follow-up, as patient has history of missing appointments.  -Instructed patient to avoid soaking his feet until the incision and wound have healed.  -Patient will return to clinic in 1 week.     Discussed patient assessment and plan with ALEXANDRA Bunch DPM  Podiatric Resident, PGY-1  Pager #: [phone] or PerfectServe

## 2020-11-24 LAB
AFB CULTURE (MYCOBACTERIA): NORMAL
AFB SMEAR: NORMAL

## 2020-11-30 ENCOUNTER — OFFICE VISIT (OUTPATIENT)
Dept: INTERNAL MEDICINE CLINIC | Age: 65
End: 2020-11-30
Payer: MEDICARE

## 2020-11-30 PROCEDURE — 99213 OFFICE O/P EST LOW 20 MIN: CPT | Performed by: PODIATRIST

## 2020-11-30 NOTE — PATIENT INSTRUCTIONS
Follow up 1 week  Every other day dressing changes to the right foot. Betadine to the wounds. Guaze, kerlix, ace bandage. Apply Betadine to all web spaces in both feet.

## 2020-11-30 NOTE — PROGRESS NOTES
Department of Podiatry  Resident Progress Note    Chandrakant Hannon  Allergies: Patient has no known allergies. SUBJECTIVE  The patient is a 77 y.o. male who presents s/p I&D with first metatarsal head resection (DOS:10/8/2020). Patient states he has been compliant with his nonweightbearing status to his right lower extremity this past week. Patient denies any new complaints since his last visit. Patient states his dressings have been changed every other day with Betadine and dry sterile dressing. Patient denies any drainage from the incision site. Patient has noticed improvement to his right lower extremity in regards to swelling and redness to his incision site. Patient denies fever, chills, cough, nausea, vomiting, shortness of breath, chest pain. Patient has no other pedal complaints at this time. Past Medical History:        Diagnosis Date    Diabetes mellitus ([address]     Hypertension     MRSA (methicillin resistant staph aureus) culture positive 09/14/2020    foot       REVIEW OF SYSTEMS:  Review of Systems: A 12 point review of systems is unremarkable with the exception of the chief complaint. Patient specifically denies nausea, vomiting, fever, chills, shortness of breath, chest pain, abdominal pain, constipation, or difficulty urinating. OBJECTIVE  Patient presents to clinic in a wheelchair with surgical shoe to the bilateral foot. Patient is alert and oriented to person, place, and time and appears to be in no acute distress. VASCULAR: DP and PT pulses are palpable 2/4 b/l. CFT is brisk to the digits of the foot b/l. Skin temperature is warm to warm from proximal to distal with no focal calor noted. No pain with calf compression b/l. NEUROLOGIC: Gross and epicritic sensation is diminished b/l. Protective sensation is diminished at all pedal sites b/l. DERMATOLOGIC: Dermatological changes noted to B/L LE.   Surgical incision noted to the distal aspect of the right first metatarsal head with no dehiscence of incision noted. Hyperkeratotic tissue overlying aspects of incision site. No drainage noted. No fluctuance or crepitus noted. No malodor noted. Right second digit nail noted to be absent, with hyperkeratotic tissue overlying. No purulence or malodor noted. No fluctuance or crepitus noted. No open wound noted. Diffuse xerotic changes noted to bilateral lower extremity. Nails 3-5 of RLE, 1-5 LLE noted to be dystrophic, discolored, but within normal length. Maceration noted to webspaces 2-4 RLE, 1-4 LLE. Patient provided verbal consent for today's photos. MUSCULOSKELETAL: Muscle strength deferred at this time due to postop state. No pain with palpation of the foot or ankle b/l. Ankle joint ROM is decreased in dorsiflexion with the knee extended. S/p first ray resection. IMAGING:  Right foot x-ray 11/9/2020  Narrative    Clinical history: Right-sided foot pain. Second digit swelling.         Comparisons: 10/8/2020.         FINDINGS:         Stable findings of previous amputation of the great toe and distal first metatarsal.         No bony destruction identified in the remaining bony structures of the right foot. Soft tissue swelling in the right second toe with no evidence of periosteal reaction. No foreign bodies. Soft tissue swelling in the midfoot, best seen on the lateral    view. Dorsal and plantar calcaneal spurs.              Impression    1. Stable appearance of bony structures of right foot, including stable findings of amputation of the great toe and distal first metatarsal. No evidence of active bony destruction or periosteal reaction. Prominent soft tissue swelling in the midfoot. ASSESSMENT  1. S/p first metatarsal head resection (DOS 10/8/2020)  2. Diabetic foot ulcer; RLE 2nd digit- resolved  3. Diabetes mellitus type 2 with peripheral neuropathy   4.   History of noncompliance with weightbearing status and missing appointments    PLAN  -Evaluation and management x 30 minutes and greater than 50% of the time spent explaining the etiology and treatment with the patient. -XR right foot reviewed-impression noted above  -Right foot dressed with Betadine to incision site, gauze, Kerlix, Ace bandage  -Patient to change dressing every other day as listed above. -Patient to remain nonweightbearing to the right lower extremity at all times.  -Patient to wear surgical shoe for protection of B/L LE  -Betadine applied to webspaces 2-4 RLE, 1-4 LLE. Instructed patient to apply Betadine every day to webspaces. Patient understood  -Educated the patient on importance of close follow-up, as patient has history of missing appointments.  -Instructed patient to avoid soaking his feet until the incision and wound have healed.  -Patient will return to clinic in 1 week.     Discussed patient assessment and plan with ALEXANDRA Castorena DPM  Podiatric Resident, PGY-1  Pager #: [phone] or PerfectServe

## 2020-12-07 ENCOUNTER — OFFICE VISIT (OUTPATIENT)
Dept: INTERNAL MEDICINE CLINIC | Age: 65
End: 2020-12-07
Payer: MEDICARE

## 2020-12-07 PROCEDURE — 99213 OFFICE O/P EST LOW 20 MIN: CPT | Performed by: PODIATRIST

## 2020-12-07 PROCEDURE — 11721 DEBRIDE NAIL 6 OR MORE: CPT

## 2020-12-07 RX ORDER — AMMONIUM LACTATE 12 G/100G
LOTION TOPICAL
Qty: 1 BOTTLE | Refills: 3 | Status: SHIPPED | OUTPATIENT
Start: 2020-12-07

## 2020-12-07 NOTE — PROGRESS NOTES
Department of Podiatry  Resident Progress Note    Lu Mata  Allergies: Patient has no known allergies. SUBJECTIVE  The patient is a 77 y.o. male who presents s/p I&D with first metatarsal head resection (DOS:10/8/2020). Patient states he has been heel weightbearing to his right lower extremity this past week. Patient denies any new complaints since his last visit. Patient states his dressings have been changed every other day with Betadine and Band-Aid. Patient denies any drainage from the incision site. Patient has noticed improvement to his right lower extremity in regards to swelling and redness to his incision site. Patient denies fever, chills, cough, nausea, vomiting, shortness of breath, chest pain. Patient has no other pedal complaints at this time. Past Medical History:        Diagnosis Date    Diabetes mellitus ([address]     Hypertension     MRSA (methicillin resistant staph aureus) culture positive 09/14/2020    foot       REVIEW OF SYSTEMS:  Review of Systems: A 12 point review of systems is unremarkable with the exception of the chief complaint. Patient specifically denies nausea, vomiting, fever, chills, shortness of breath, chest pain, abdominal pain, constipation, or difficulty urinating. OBJECTIVE  Patient presents to clinic in a wheelchair with surgical shoe to the bilateral foot. Patient is alert and oriented to person, place, and time and appears to be in no acute distress. VASCULAR: DP and PT pulses are palpable 2/4 b/l. CFT is brisk to the digits of the foot b/l. Skin temperature is warm to warm from proximal to distal with no focal calor noted. No pain with calf compression b/l. NEUROLOGIC: Gross and epicritic sensation is diminished b/l. Protective sensation is diminished at all pedal sites b/l. DERMATOLOGIC: Dermatological changes noted to B/L LE.   Surgical incision noted to the distal aspect of the right first metatarsal head with no dehiscence of incision noted.  No drainage noted. No fluctuance or crepitus noted. No malodor noted. No signs of acute infection. Right second digit nail noted to be absent, with hyperkeratotic tissue overlying. No purulence or malodor noted. No fluctuance or crepitus noted. No open wound noted. Diffuse xerotic changes noted to bilateral lower extremity. Nails 3-5 of RLE, 1-5 LLE noted to be dystrophic, discolored, but within normal length. Maceration noted to webspaces 2-4 RLE, 1-4 LLE; improved. Patient provided verbal consent for today's photos. MUSCULOSKELETAL: Muscle strength 5/5 for all pedal quadrants tested. No pain with palpation of the foot or ankle b/l. Ankle joint ROM is decreased in dorsiflexion with the knee extended. S/p first ray resection. IMAGING:  Right foot x-ray 11/9/2020  Narrative    Clinical history: Right-sided foot pain. Second digit swelling.         Comparisons: 10/8/2020.         FINDINGS:         Stable findings of previous amputation of the great toe and distal first metatarsal.         No bony destruction identified in the remaining bony structures of the right foot. Soft tissue swelling in the right second toe with no evidence of periosteal reaction. No foreign bodies. Soft tissue swelling in the midfoot, best seen on the lateral    view. Dorsal and plantar calcaneal spurs.              Impression    1. Stable appearance of bony structures of right foot, including stable findings of amputation of the great toe and distal first metatarsal. No evidence of active bony destruction or periosteal reaction. Prominent soft tissue swelling in the midfoot. ASSESSMENT  1. S/p first metatarsal head resection (DOS 10/8/2020)  2. Diabetic foot ulcer; RLE 2nd digit- resolved  3. Xerosis cutis, bilateral lower extremity  4. Onychomycosis, nails 3-5 right and 1-5 left  5. Diabetes mellitus type 2 with peripheral neuropathy   6.   History of noncompliance with weightbearing status and

## 2020-12-07 NOTE — PATIENT INSTRUCTIONS
Heel weight bear only in right foot in surgical shoe. Bandaide to wound. Lotion to top and bottom of feet . Not over wound area ,Not between toes,  Return in 1 week.

## 2020-12-14 ENCOUNTER — OFFICE VISIT (OUTPATIENT)
Dept: INTERNAL MEDICINE CLINIC | Age: 65
End: 2020-12-14
Payer: MEDICARE

## 2020-12-14 PROCEDURE — 99213 OFFICE O/P EST LOW 20 MIN: CPT | Performed by: PODIATRIST

## 2020-12-14 NOTE — PROGRESS NOTES
Department of Podiatry  Resident Progress Note    [facility]  Allergies: Patient has no known allergies. SUBJECTIVE  The patient is a 77 y.o. male who presents s/p I&D with first metatarsal head resection (DOS:10/8/2020). Patient states he is feeling well this morning, but admits to being tired as he did not sleep well last night. He reports he has been nonweight bearing using surgical shoes as instructed. He admits to being diabetic and states his blood sugar has been well controlled, however he is unable to report a recent blood sugar value. He denies any other complaints at this time. Denies nausea, vomiting, fever, chills, shortness of breath or chest pain at time of interview. Past Medical History:        Diagnosis Date    Diabetes mellitus ([address]     Hypertension     MRSA (methicillin resistant staph aureus) culture positive 09/14/2020    foot       REVIEW OF SYSTEMS:  Review of Systems: A 12 point review of systems is unremarkable with the exception of the chief complaint. Patient specifically denies nausea, vomiting, fever, chills, shortness of breath, chest pain, abdominal pain, constipation, or difficulty urinating. OBJECTIVE  Patient presents to clinic in a wheelchair with surgical shoe to the bilateral foot. Patient is alert and oriented to person, place, and time and appears to be in no acute distress. VASCULAR: DP and PT pulses are palpable 2/4 b/l. CFT is brisk to the digits of the foot b/l. Skin temperature is warm to warm from proximal to distal with no focal calor noted. No pain with calf compression b/l. NEUROLOGIC: Gross and epicritic sensation is diminished b/l. Protective sensation is diminished at all pedal sites b/l. DERMATOLOGIC: Dermatological changes noted to B/L LE. Well healed cicatrix noted to the distal aspect of the right first metatarsal head with no signs of dehiscence. Right second digit nail noted to be absent, with hyperkeratotic tissue overlying. No open wound noted. Diffuse xerotic changes noted to bilateral lower extremity. Nails 3-5 of RLE, 1-5 LLE noted to be dystrophic, discolored, but within normal length. Maceration noted to webspaces 2-4 RLE, 1-4 LLE; webspaces remain intact. MUSCULOSKELETAL: Muscle strength 5/5 for all pedal quadrants tested. No pain with palpation of the foot or ankle b/l. Ankle joint ROM is decreased in dorsiflexion with the knee extended. S/p first ray resection. IMAGING:  Right foot x-ray 11/9/2020  Narrative    Clinical history: Right-sided foot pain. Second digit swelling.         Comparisons: 10/8/2020.         FINDINGS:         Stable findings of previous amputation of the great toe and distal first metatarsal.         No bony destruction identified in the remaining bony structures of the right foot. Soft tissue swelling in the right second toe with no evidence of periosteal reaction. No foreign bodies. Soft tissue swelling in the midfoot, best seen on the lateral    view. Dorsal and plantar calcaneal spurs.              Impression    1. Stable appearance of bony structures of right foot, including stable findings of amputation of the great toe and distal first metatarsal. No evidence of active bony destruction or periosteal reaction. Prominent soft tissue swelling in the midfoot. ASSESSMENT  1. S/p first metatarsal head resection (DOS 10/8/2020)  2. Diabetic foot ulcer; RLE 2nd digit- resolved  3. Xerosis cutis, bilateral lower extremity  4. Onychomycosis, nails 3-5 right and 1-5 left  5. Diabetes mellitus type 2 with peripheral neuropathy   6.   History of noncompliance with weightbearing status and missing appointments    PLAN -Evaluation and management x 30 minutes and greater than 50% of the time spent explaining the etiology and treatment with the patient.   -Patient may transition to full weight bearing in a surgical shoe to the right lower extremity.  -Patient is able to wear normal shoe gear to left foot. -Betadine applied to webspaces 2-4 RLE, 1-4 LLE. Instructed patient to apply Betadine every day to webspaces. Patient understood.  -Prescription for diabetic shoes given to patient. Patient would benefit from diabetic shoes with hallux filler insert given his peripheral neuropathy and history of partial first metatarsal resection. Patient is to wear surgical shoe to right foot until he is fitted for diabetic shoes as he is known to have worn very damaged tennis shoes to prior appointments.   -Patient requested we fill out necessary paperwork for physical therapy to continue working with him. I am personally unaware of what the patient was referring to, but he would benefit from continued physical therapy given his new full weight bearing status beginning today. I asked the patient to call our clinic once he is home and has appropriate paperwork at hand to discuss. Patient understood.   -Patient will return to clinic in 1 month for follow up of diabetic foot care and evaluation of new diabetic shoes.  .    Discussed patient assessment and plan with ALEXANDRA Eagle DPM  Podiatric Resident, PGY-1  Pager #: [phone] or Silviano Alonso

## 2020-12-14 NOTE — PATIENT INSTRUCTIONS
Get diabetic shoes   Return in 1 month  Wear surgical shoe to right foot until you get diabetic shoe. f

## 2023-01-01 NOTE — PROGRESS NOTES
Patient arrived to the unit. 4 Eye was done along with assessment. Patient taken to surgery within 20 minutes of reaching the floor. VS stable and family at the bedside.   Electronically signed by Rosey Kramer RN on 9/14/2020 at 5:42 PM ,[email or web address]

## (undated) DEVICE — COAXIAL HIGH FLOW TIP WITH SOFT SHIELD

## (undated) DEVICE — SUTURE ETHLN SZ 3-0 L18IN NONABSORBABLE BLK PS-2 L19MM 3/8 1669H

## (undated) DEVICE — BANDAGE,ELASTIC,ESMARK,STERILE,6"X9',LF: Brand: MEDLINE

## (undated) DEVICE — COVER LT HNDL BLU PLAS

## (undated) DEVICE — COVER,MAYO STAND,XL,STERILE: Brand: MEDLINE

## (undated) DEVICE — MICRO SAGITTAL BLADE (9.4 X 0.4 X 26.2MM)

## (undated) DEVICE — GLOVE SURG SZ 75 L12IN FNGR THK94MIL WHT POLYMER LTX BEAD

## (undated) DEVICE — NEEDLE HYPO 16GA L1.5IN PUR POLYPR HUB S STL REG BVL STR

## (undated) DEVICE — SPONGE,LAP,18"X18",DLX,XR,ST,5/PK,40/PK: Brand: MEDLINE

## (undated) DEVICE — STANDARD HYPODERMIC NEEDLE,POLYPROPYLENE HUB: Brand: MONOJECT

## (undated) DEVICE — TRAY PREP DRY W/ PREM GLV 2 APPL 6 SPNG 2 UNDPD 1 OVERWRAP

## (undated) DEVICE — GARMENT,MEDLINE,DVT,INT,CALF,MED, GEN2: Brand: MEDLINE

## (undated) DEVICE — PAD,ABDOMINAL,5"X9",ST,LF,25/BX: Brand: MEDLINE INDUSTRIES, INC.

## (undated) DEVICE — SOLUTION IV IRRIG 0.9% NACL 3000ML BAG 2B7477

## (undated) DEVICE — GAUZE,PACKING STRIP,IODOFORM,1/2"X5YD,ST: Brand: CURAD

## (undated) DEVICE — 6 ML SYRINGE LUER-LOCK TIP: Brand: MONOJECT

## (undated) DEVICE — PLATE ES AD W 9FT CRD 2

## (undated) DEVICE — 4MM FLAT DRAIN WITH SIDE HOLES, STERILE: Brand: TLS

## (undated) DEVICE — COVER,TABLE,HEAVY DUTY,77"X90",STRL: Brand: MEDLINE

## (undated) DEVICE — SUTURE ETHLN SZ 5-0 L18IN NONABSORBABLE BLK P-3 L13MM 3/8 698G

## (undated) DEVICE — SUTURE VCRL SZ 3-0 L18IN ABSRB UD PS-2 L19MM 1/2 CIR J497G

## (undated) DEVICE — SYRINGE MED 10ML LUERLOCK TIP W/O SFTY DISP

## (undated) DEVICE — E-Z CLEAN, NON-STICK, PTFE COATED, ELECTROSURGICAL BLADE ELECTRODE, 2.5 INCH (6.35 CM): Brand: EZ CLEAN

## (undated) DEVICE — SUTURE MCRYL SZ 4-0 L27IN ABSRB UD L19MM PS-2 1/2 CIR PRIM Y426H

## (undated) DEVICE — SYRINGE MED 10ML TRNSLUC BRL PLUNG BLK MRK POLYPR CTRL

## (undated) DEVICE — BASIC SINGLE BASIN 1-LF: Brand: MEDLINE INDUSTRIES, INC.

## (undated) DEVICE — SUTURE ETHLN SZ 3-0 L18IN NONABSORBABLE BLK FS-1 L24MM 3/8 663H

## (undated) DEVICE — STRIP WND PK W0.25INXL5YD IODO GZ TIGHTLY WVN CURAD

## (undated) DEVICE — SUTURE COAT VCRL SZ 4-0 L18IN ABSRB UD L19MM PS-2 1/2 CIR J496G

## (undated) DEVICE — HANDPIECE SUCTION TUBING INTERPULSE 10FT

## (undated) DEVICE — ZIMMER® A.T.S. CYLINDRICAL TOURNIQUET CUFF, DUAL PORT, SINGLE BLADDER 18 IN. (46 CM)

## (undated) DEVICE — JEWISH HOSPITAL TURNOVER KIT: Brand: MEDLINE INDUSTRIES, INC.

## (undated) DEVICE — Z INACTIVE NO SUPPLIER SOLUTIONIRRIG 3000ML 0.9% SOD CHL FLX CONT [79720808] [HOSPIRA WORLDWIDE INC]

## (undated) DEVICE — Z DISCONTINUED NO SUB IDED GLOVE SURG BEAD CUF 8 STD PF WHT STRL TRIUMPH LT LTX

## (undated) DEVICE — 3M™ TEGADERM™ TRANSPARENT FILM DRESSING FRAME STYLE, 1626W, 4 IN X 4-3/4 IN (10 CM X 12 CM), 50/CT 4CT/CASE: Brand: 3M™ TEGADERM™

## (undated) DEVICE — PODIATRY PK

## (undated) DEVICE — SOLUTION IV 1000ML 0.9% SOD CHL

## (undated) DEVICE — INTENDED FOR TISSUE SEPARATION, AND OTHER PROCEDURES THAT REQUIRE A SHARP SURGICAL BLADE TO PUNCTURE OR CUT.: Brand: BARD-PARKER ® CARBON RIB-BACK BLADES

## (undated) DEVICE — SUTURE VCRL SZ 3-0 L27IN ABSRB UD L19MM PS-2 3/8 CIR PRIM J427H

## (undated) DEVICE — SUTURE VCRL SZ 3-0 L27IN ABSRB UD L26MM SH 1/2 CIR J416H

## (undated) DEVICE — GOWN,SIRUS,POLYRNF,BRTHSLV,XL,30/CS: Brand: MEDLINE

## (undated) DEVICE — SUTURE ETHLN SZ 4-0 L18IN NONABSORBABLE BLK L24MM FS-1 3/8 1629H

## (undated) DEVICE — LOOP,VESSEL,MAXI,BLUE,2/PK,STERILE: Brand: MEDLINE

## (undated) DEVICE — DRAPE C ARM UNIV W41XL74IN CLR PLAS XR VELC CLSR POLY STRP